# Patient Record
Sex: FEMALE | Race: ASIAN | Employment: FULL TIME | URBAN - METROPOLITAN AREA
[De-identification: names, ages, dates, MRNs, and addresses within clinical notes are randomized per-mention and may not be internally consistent; named-entity substitution may affect disease eponyms.]

---

## 2018-05-29 ENCOUNTER — OFFICE VISIT (OUTPATIENT)
Dept: FAMILY MEDICINE | Facility: CLINIC | Age: 63
End: 2018-05-29
Payer: COMMERCIAL

## 2018-05-29 VITALS
HEART RATE: 84 BPM | BODY MASS INDEX: 24.8 KG/M2 | WEIGHT: 123 LBS | DIASTOLIC BLOOD PRESSURE: 90 MMHG | RESPIRATION RATE: 16 BRPM | SYSTOLIC BLOOD PRESSURE: 152 MMHG | HEIGHT: 59 IN | TEMPERATURE: 98.4 F | OXYGEN SATURATION: 100 %

## 2018-05-29 DIAGNOSIS — Z11.59 NEED FOR HEPATITIS C SCREENING TEST: ICD-10-CM

## 2018-05-29 DIAGNOSIS — Z12.11 SCREEN FOR COLON CANCER: ICD-10-CM

## 2018-05-29 DIAGNOSIS — Z11.4 SCREENING FOR HIV (HUMAN IMMUNODEFICIENCY VIRUS): ICD-10-CM

## 2018-05-29 DIAGNOSIS — Z13.6 CARDIOVASCULAR SCREENING; LDL GOAL LESS THAN 130: ICD-10-CM

## 2018-05-29 DIAGNOSIS — Z12.31 VISIT FOR SCREENING MAMMOGRAM: ICD-10-CM

## 2018-05-29 DIAGNOSIS — Z00.01 ENCOUNTER FOR ROUTINE ADULT PHYSICAL EXAM WITH ABNORMAL FINDINGS: Primary | ICD-10-CM

## 2018-05-29 DIAGNOSIS — Z12.4 SCREENING FOR MALIGNANT NEOPLASM OF CERVIX: ICD-10-CM

## 2018-05-29 DIAGNOSIS — L91.8 SKIN TAG: ICD-10-CM

## 2018-05-29 DIAGNOSIS — I10 HYPERTENSION, GOAL BELOW 140/90: ICD-10-CM

## 2018-05-29 PROCEDURE — 99396 PREV VISIT EST AGE 40-64: CPT | Mod: 25 | Performed by: FAMILY MEDICINE

## 2018-05-29 PROCEDURE — 11200 RMVL SKIN TAGS UP TO&INC 15: CPT | Performed by: FAMILY MEDICINE

## 2018-05-29 ASSESSMENT — PAIN SCALES - GENERAL: PAINLEVEL: NO PAIN (0)

## 2018-05-29 NOTE — NURSING NOTE
The following medication was given by Dr Gee during procedure:     MEDICATION: Lidocaine 1% with epinephrine   LOT #: [id number]  :  [employer]  EXPIRATION DATE:  08/19 opened bottle expiring 05/31/2018  NDC#: 71301-668-81     Rian Canchola CMA

## 2018-05-29 NOTE — PATIENT INSTRUCTIONS
At [facility], we strive to deliver an exceptional experience to you, every time we see you.  If you receive a survey in the mail, please send us back your thoughts. We really do value your feedback.    Based on your medical history, these are the current health maintenance/preventive care services that you are due for (some may have been done at this visit.)  Health Maintenance Due   Topic Date Due     HIV SCREEN (SYSTEM ASSIGNED)  08/16/1975     HEPATITIS C SCREENING  08/16/1975     FIT Q1 YR  08/15/2016     MAMMO SCREEN Q2 YR (SYSTEM ASSIGNED)  09/10/2017     PAP Q3 YR  08/06/2018         Suggested websites for health information:  Www.[email or web address].[email or web address] : Up to date and easily searchable information on multiple topics.  Www.medlineplus.gov : medication info, interactive tutorials, watch real surgeries online  Www.familydoctor.org : good info from the Academy of Family Physicians  Www.cdc.gov : public health info, travel advisories, epidemics (H1N1)  Www.aap.org : children's health info, normal development, vaccinations  Www.health.[facility].[place].us : [place] dept of health, public health issues in [place], N1N1    Your care team:                            Family Medicine Internal Medicine   MD Tonio Leyva MD Shantel Branch-Fleming, MD Katya Georgiev PA-C Nam Ho, MD Pediatrics   BRICE Edwards, MD Radha Rangel CNP, MD Deborah Mielke, MD Kim Thein, APRN [facility]      Clinic hours: Monday - Thursday 7 am-7 pm; Fridays 7 am-5 pm.   Urgent care: Monday - Friday 11 am-9 pm; Saturday and Sunday 9 am-5 pm.  Pharmacy : Monday -Thursday 8 am-8 pm; Friday 8 am-6 pm; Saturday and Sunday 9 am-5 pm.     Clinic: [phone]   Pharmacy: [phone]    Preventive Health Recommendations  Female Ages 50 - 64    Yearly exam: See your health care provider every year in order to  o Review health changes.   o Discuss preventive care.     o Review your medicines if your doctor has prescribed any.      Get a Pap test every three years (unless you have an abnormal result and your provider advises testing more often).    If you get Pap tests with HPV test, you only need to test every 5 years, unless you have an abnormal result.     You do not need a Pap test if your uterus was removed (hysterectomy) and you have not had cancer.    You should be tested each year for STDs (sexually transmitted diseases) if you're at risk.     Have a mammogram every 1 to 2 years.    Have a colonoscopy at age 50, or have a yearly FIT test (stool test). These exams screen for colon cancer.      Have a cholesterol test every 5 years, or more often if advised.    Have a diabetes test (fasting glucose) every three years. If you are at risk for diabetes, you should have this test more often.     If you are at risk for osteoporosis (brittle bone disease), think about having a bone density scan (DEXA).    Shots: Get a flu shot each year. Get a tetanus shot every 10 years.    Nutrition:     Eat at least 5 servings of fruits and vegetables each day.    Eat whole-grain bread, whole-wheat pasta and brown rice instead of white grains and rice.    Talk to your provider about Calcium and Vitamin D.     Lifestyle    Exercise at least 150 minutes a week (30 minutes a day, 5 days a week). This will help you control your weight and prevent disease.    Limit alcohol to one drink per day.    No smoking.     Wear sunscreen to prevent skin cancer.     See your dentist every six months for an exam and cleaning.    See your eye doctor every 1 to 2 years.

## 2018-05-29 NOTE — PROGRESS NOTES
SUBJECTIVE:   CC: Janet Giordano is an 62 year old woman who presents for preventive health visit.     Physical   Annual:     Getting at least 3 servings of Calcium per day::  Yes    Bi-annual eye exam::  Yes (Next appointment 5/31/18)    Dental care twice a year::  Yes    Sleep apnea or symptoms of sleep apnea::  None    Diet::  Regular (no restrictions)    Frequency of exercise::  6-7 days/week    Duration of exercise::  15-30 minutes    Taking medications regularly::  No    Barriers to taking medications::  Other (prn GERD medication not needed at this time)    Additional concerns today::  YES (lesions on left side of neck)            Skin tags and would like these to be removed due to bleeding and irritation from clothing.     Hypertension Follow-up      Outpatient blood pressures are not being checked.    Low Salt Diet: no added salt      Today's PHQ-2 Score:   PHQ-2 ( 1999 Pfizer) 5/29/2018   Q1: Little interest or pleasure in doing things 0   Q2: Feeling down, depressed or hopeless 0   PHQ-2 Score 0       Abuse: Current or Past(Physical, Sexual or Emotional)- No  Do you feel safe in your environment - Yes    Social History   Substance Use Topics     Smoking status: Never Smoker     Smokeless tobacco: Never Used     Alcohol use No     No flowsheet data found.No flowsheet data found.    Reviewed orders with patient.  Reviewed health maintenance and updated orders accordingly - Yes  Labs reviewed in EPIC  BP Readings from Last 3 Encounters:   05/29/18 152/90   01/08/16 124/84   12/17/15 134/78    Wt Readings from Last 3 Encounters:   05/29/18 123 lb (55.8 kg)   01/08/16 114 lb 14.4 oz (52.1 kg)   12/17/15 115 lb 4.8 oz (52.3 kg)                  Patient Active Problem List   Diagnosis     Hypertension, goal below 140/90     CARDIOVASCULAR SCREENING; LDL GOAL LESS THAN 130     Chronic fatigue     Gastroesophageal reflux disease with esophagitis     Immigrant with language difficulty     Advanced directives,  counseling/discussion     Helicobacter pylori infection     Gallstones     Past Surgical History:   Procedure Laterality Date     ESOPHAGOSCOPY, GASTROSCOPY, DUODENOSCOPY (EGD), COMBINED N/A 10/23/2015    Procedure: COMBINED ESOPHAGOSCOPY, GASTROSCOPY, DUODENOSCOPY (EGD);  Surgeon: Duane, William Charles, MD;  Location:  OR     ESOPHAGOSCOPY, GASTROSCOPY, DUODENOSCOPY (EGD), COMBINED N/A 10/23/2015    Procedure: COMBINED ESOPHAGOSCOPY, GASTROSCOPY, DUODENOSCOPY (EGD), BIOPSY SINGLE OR MULTIPLE;  Surgeon: Duane, William Charles, MD;  Location:  OR       Social History   Substance Use Topics     Smoking status: Never Smoker     Smokeless tobacco: Never Used     Alcohol use No     History reviewed. No pertinent family history.      Current Outpatient Prescriptions   Medication Sig Dispense Refill     Cholecalciferol (VITAMIN D3 PO) Take 600 Units by mouth        Multiple Vitamins-Calcium (ONE-A-DAY WOMENS PO)        order for DME BP cuff with supplies, brand as covered by insurance.  Dx: HTN 1 each 0     ranitidine (ZANTAC) 150 MG tablet Take 1 tablet (150 mg) by mouth 2 times daily (Patient not taking: Reported on 5/29/2018) 60 tablet 1     sucralfate (CARAFATE) 1 GM/10ML suspension Take 10 mLs (1 g) by mouth 2 times daily (Patient not taking: Reported on 5/29/2018) 420 mL 1     Allergies   Allergen Reactions     Contrast Dye Shortness Of Breath and Cough     After CT contrast injection she felt nauseous and lead to cough and shortness of breath. Radiologist felt this was an allergic reaction. Isovue 370     Recent Labs   Lab Test  11/03/15   1442  10/20/15   1109  09/03/15   0719  08/06/15   1103   LDL   --    --   152*   --    HDL   --    --   56   --    TRIG   --    --   211*   --    ALT  44  38   --    --    CR  0.65  0.53   --   0.62   GFRESTIMATED  >90  Non  GFR Calc    >90  Non  GFR Calc     --   >90  Non  GFR Calc     GFRESTBLACK  >90   GFR  "Calc    >90   GFR Calc     --   >90   GFR Calc     POTASSIUM  3.8  3.5   --   3.7   TSH   --   0.64   --   0.83        Patient over age 50, mutual decision to screen reflected in health maintenance.    Pertinent mammograms are reviewed under the imaging tab.  History of abnormal Pap smear: NO - age 30-65 PAP every 5 years with negative HPV co-testing recommended    Reviewed and updated as needed this visit by clinical staff  Tobacco  Allergies  Meds  Med Hx  Surg Hx  Fam Hx  Soc Hx        Reviewed and updated as needed this visit by Provider            Review of Systems  CONSTITUTIONAL: NEGATIVE for fever, chills, change in weight  INTEGUMENTARY/SKIN: NEGATIVE for worrisome rashes, moles or lesions  EYES: NEGATIVE for vision changes or irritation  ENT: NEGATIVE for ear, mouth and throat problems  RESP: NEGATIVE for significant cough or SOB  BREAST: NEGATIVE for masses, tenderness or discharge  CV: NEGATIVE for chest pain, palpitations or peripheral edema  GI: NEGATIVE for nausea, abdominal pain, heartburn, or change in bowel habits  : NEGATIVE for unusual urinary or vaginal symptoms. No vaginal bleeding.  MUSCULOSKELETAL: NEGATIVE for significant arthralgias or myalgia  NEURO: NEGATIVE for weakness, dizziness or paresthesias  PSYCHIATRIC: NEGATIVE for changes in mood or affect      OBJECTIVE:   /90 (BP Location: Left arm, Patient Position: Chair, Cuff Size: Adult Regular)  Pulse 84  Temp 98.4  F (36.9  C) (Oral)  Resp 16  Ht 4' 10.5\" (1.486 m)  Wt 123 lb (55.8 kg)  LMP 05/29/2008 (Approximate)  SpO2 100%  BMI 25.27 kg/m2  Physical Exam  GENERAL APPEARANCE: healthy, alert and no distress  EYES: Eyes grossly normal to inspection, PERRL and conjunctivae and sclerae normal  HENT: ear canals and TM's normal, nose and mouth without ulcers or lesions, oropharynx clear and oral mucous membranes moist  NECK: no adenopathy, no asymmetry, masses, or scars and thyroid normal " "to palpation  RESP: lungs clear to auscultation - no rales, rhonchi or wheezes  CV: regular rates and rhythm, normal S1 S2, no S3 or S4, no murmur, click or rub, no peripheral edema and peripheral pulses strong  ABDOMEN: soft, nontender, no hepatosplenomegaly, no masses and bowel sounds normal  MS: no musculoskeletal defects are noted and gait is age appropriate without ataxia  SKIN: no suspicious lesions or rashes, skin tags around neck region and right axilla.   NEURO: Normal strength and tone, sensory exam grossly normal, mentation intact and speech normal  PSYCH: mentation appears normal and affect normal/bright     ASSESSMENT/PLAN:       ICD-10-CM    1. Hypertension, goal below 140/90 I10 **CBC with platelets FUTURE anytime     **Basic metabolic panel FUTURE anytime   2. Screen for colon cancer Z12.11    3. Visit for screening mammogram Z12.31 MA SCREENING DIGITAL BILAT - Future  (s+30)   4. Screening for malignant neoplasm of cervix Z12.4    5. Need for hepatitis C screening test Z11.59    6. Screening for HIV (human immunodeficiency virus) Z11.4    7. CARDIOVASCULAR SCREENING; LDL GOAL LESS THAN 130 Z13.6 Lipid panel reflex to direct LDL Fasting       COUNSELING:  Reviewed preventive health counseling, as reflected in patient instructions       Regular exercise       Healthy diet/nutrition         reports that she has never smoked. She has never used smokeless tobacco.    Estimated body mass index is 25.27 kg/(m^2) as calculated from the following:    Height as of this encounter: 4' 10.5\" (1.486 m).    Weight as of this encounter: 123 lb (55.8 kg).       Counseling Resources:  ATP IV Guidelines  Pooled Cohorts Equation Calculator  Breast Cancer Risk Calculator  FRAX Risk Assessment  ICSI Preventive Guidelines  Dietary Guidelines for Americans, 2010  [employer]'s MyPlate  ASA Prophylaxis  Lung CA Screening    Marixa Gee MD  [facility]    Subjective: Janet Vo a 62 year old female who " presents today for lesion removal. The lesion(s) is/are located on the axilla and neck, number 10 and measures 0.1cm She The patient reports the lesions are bleeding and painful and denies other significant symptoms on ROS. Medications reviewed.    Pause for the cause has been completed prior to the prceedure.   1. Janet was identified by both name and date of birth   2. The correct site was identified   3. Site was marked by provider    4. Written informed consent correct and signed or verbal authorization  to proceed was obtained   5. Verifed necessary supplies, equipment, and diagnostics are available    6. Time out was performed immediately prior to procedure    Objective: The lesion(s) is/are of the above mentioned size and location and is/are typical. The area was prepped and appropriately anesthetized. Using the usual technique, excision with an iris scissors was performed. An appropriate dressing was applied. The procedure was well tolerated and without complications.     Assessment: 10 skin tags, 3 left neck, 5 right neck and 2 right axilla    Plan: Follow up: The patient may return prn.. Wound care instructions provided.  Patient was instructed to be alert for any signs of cutaneous infection.

## 2018-05-29 NOTE — MR AVS SNAPSHOT
After Visit Summary   5/29/2018    Janet Giordano    MRN: [id number]           Patient Information     Date Of Birth          1955        Visit Information        Provider Department      5/29/2018 2:20 PM Marixa Gee MD [facility]        Today's Diagnoses     Encounter for routine adult physical exam with abnormal findings    -  1    Screen for colon cancer        Visit for screening mammogram        Screening for malignant neoplasm of cervix        Need for hepatitis C screening test        Screening for HIV (human immunodeficiency virus)        Hypertension, goal below 140/90        CARDIOVASCULAR SCREENING; LDL GOAL LESS THAN 130        Skin tag          Care Instructions    At [facility], we strive to deliver an exceptional experience to you, every time we see you.  If you receive a survey in the mail, please send us back your thoughts. We really do value your feedback.    Based on your medical history, these are the current health maintenance/preventive care services that you are due for (some may have been done at this visit.)  Health Maintenance Due   Topic Date Due     HIV SCREEN (SYSTEM ASSIGNED)  08/16/1975     HEPATITIS C SCREENING  08/16/1975     FIT Q1 YR  08/15/2016     MAMMO SCREEN Q2 YR (SYSTEM ASSIGNED)  09/10/2017     PAP Q3 YR  08/06/2018         Suggested websites for health information:  Www.[email or web address] : Up to date and easily searchable information on multiple topics.  Www.medlineplus.gov : medication info, interactive tutorials, watch real surgeries online  Www.familydoctor.org : good info from the Academy of Family Physicians  Www.cdc.gov : public health info, travel advisories, epidemics (H1N1)  Www.aap.org : children's health info, normal development, vaccinations  Www.health.state.[place].us : [place] dept of health, public health issues in [place], N1N1    Your care team:                            Family Medicine Internal Medicine    MD Tonio Leyva MD Shantel Branch-Fleming, MD Katya Georgiev PA-C Nam Ho, MD Pediatrics   BRICE Edwards, MARY Sandhu APRMD Radha Barney CNP, MD Deborah Mielke, MD Kim Thein, APRSand[facility] Critical Access [facility]      Clinic hours: Monday - Thursday 7 am-7 pm; Fridays 7 am-5 pm.   Urgent care: Monday - Friday 11 am-9 pm; Saturday and Sunday 9 am-5 pm.  Pharmacy : Monday -Thursday 8 am-8 pm; Friday 8 am-6 pm; Saturday and Sunday 9 am-5 pm.     Clinic: [phone]   Pharmacy: [phone]    Preventive Health Recommendations  Female Ages 50 - 64    Yearly exam: See your health care provider every year in order to  o Review health changes.   o Discuss preventive care.    o Review your medicines if your doctor has prescribed any.      Get a Pap test every three years (unless you have an abnormal result and your provider advises testing more often).    If you get Pap tests with HPV test, you only need to test every 5 years, unless you have an abnormal result.     You do not need a Pap test if your uterus was removed (hysterectomy) and you have not had cancer.    You should be tested each year for STDs (sexually transmitted diseases) if you're at risk.     Have a mammogram every 1 to 2 years.    Have a colonoscopy at age 50, or have a yearly FIT test (stool test). These exams screen for colon cancer.      Have a cholesterol test every 5 years, or more often if advised.    Have a diabetes test (fasting glucose) every three years. If you are at risk for diabetes, you should have this test more often.     If you are at risk for osteoporosis (brittle bone disease), think about having a bone density scan (DEXA).    Shots: Get a flu shot each year. Get a tetanus shot every 10 years.    Nutrition:     Eat at least 5 servings of fruits and vegetables each day.    Eat whole-grain bread, whole-wheat pasta and brown rice instead of white grains and rice.    Talk to your  provider about Calcium and Vitamin D.     Lifestyle    Exercise at least 150 minutes a week (30 minutes a day, 5 days a week). This will help you control your weight and prevent disease.    Limit alcohol to one drink per day.    No smoking.     Wear sunscreen to prevent skin cancer.     See your dentist every six months for an exam and cleaning.    See your eye doctor every 1 to 2 years.            Follow-ups after your visit        Future tests that were ordered for you today     Open Future Orders        Priority Expected Expires Ordered    MA SCREENING DIGITAL BILAT - Future  (s+30) Routine  5/29/2019 5/29/2018    **CBC with platelets FUTURE anytime Routine 5/29/2018 5/29/2019 5/29/2018    Lipid panel reflex to direct LDL Fasting Routine 5/29/2018 5/29/2019 5/29/2018    **Basic metabolic panel FUTURE anytime Routine 5/29/2018 5/29/2019 5/29/2018            Who to contact     If you have questions or need follow up information about today's clinic visit or your schedule please contact [facility] directly at [phone].  Normal or non-critical lab and imaging results will be communicated to you by Friend[employer] W[employer] Apphart, letter or phone within 4 business days after the clinic has received the results. If you do not hear from us within 7 days, please contact the clinic through Paxfiret or phone. If you have a critical or abnormal lab result, we will notify you by phone as soon as possible.  Submit refill requests through Bee On The Go or call your pharmacy and they will forward the refill request to us. Please allow 3 business days for your refill to be completed.          Additional Information About Your Visit        Bee On The Go Information     [employer]ee On The Go gives you secure access to your electronic health record. If you see a primary care provider, you can also send messages to your care team and make appointments. If you have questions, please call your primary care clinic.  If you do not have a primary care provider,  "please call [phone] and they will assist you.        Care EveryWhere ID     This is your Care EveryWhere ID. This could be used by other organizations to access your [facility] medical records  [id number]        Your Vitals Were     Pulse Temperature Respirations Height Last Period Pulse Oximetry    84 98.4  F (36.9  C) (Oral) 16 4' 10.5\" (1.486 m) 05/29/2008 (Approximate) 100%    BMI (Body Mass Index)                   25.27 kg/m2            Blood Pressure from Last 3 Encounters:   05/29/18 152/90   01/08/16 124/84   12/17/15 134/78    Weight from Last 3 Encounters:   05/29/18 123 lb (55.8 kg)   01/08/16 114 lb 14.4 oz (52.1 kg)   12/17/15 115 lb 4.8 oz (52.3 kg)               Primary Care Provider Office Phone # Fax #    Samuel Torrez [phone]       [address] [address] [facility] [address]        Equal Access to Services     [facility]: Hadii aad ku hadasho Soomaali, waaxda luqadaha, qaybta kaalmada adeegyada, waxay melina rodrigues . So [facility] [phone].    ATENCIÓN: Si habla español, tiene a simpson disposición servicios gratuitos de asistencia lingüística. Llame al [phone].    We comply with applicable federal civil rights laws and [place] laws. We do not discriminate on the basis of race, color, national origin, age, disability, sex, sexual orientation, or gender identity.            Thank you!     Thank you for choosing [facility]  for your care. Our goal is always to provide you with excellent care. Hearing back from our patients is one way we can continue to improve our services. Please take a few minutes to complete the written survey that you may receive in the mail after your visit with us. Thank you!             Your Updated Medication List - Protect others around you: Learn how to safely use, store and throw away your medicines at www.disposemymeds.org.          This list is accurate as of 5/29/18  3:44 PM.  Always use your most recent " med list.                   Brand Name Dispense Instructions for use Diagnosis    ONE-A-DAY WOMENS PO           order for DME     1 each    BP cuff with supplies, brand as covered by insurance.  Dx: HTN    Hypertension, goal below 140/90       ranitidine 150 MG tablet    ZANTAC    60 tablet    Take 1 tablet (150 mg) by mouth 2 times daily    Gastroesophageal reflux disease with esophagitis       sucralfate 1 GM/10ML suspension    CARAFATE    420 mL    Take 10 mLs (1 g) by mouth 2 times daily    Gastroesophageal reflux disease with esophagitis       VITAMIN D3 PO      Take 600 Units by mouth

## 2018-06-08 ENCOUNTER — RADIANT APPOINTMENT (OUTPATIENT)
Dept: MAMMOGRAPHY | Facility: CLINIC | Age: 63
End: 2018-06-08
Payer: COMMERCIAL

## 2018-06-08 DIAGNOSIS — Z12.31 VISIT FOR SCREENING MAMMOGRAM: ICD-10-CM

## 2018-06-08 PROCEDURE — 77067 SCR MAMMO BI INCL CAD: CPT | Mod: TC

## 2018-06-08 NOTE — LETTER
June 11, 2018      Janet Giordano  [address] [place] [address]        Dear Janet Giordano,    I am happy to let you know that your mammogram was normal. You may schedule a follow up mammogram in 1-2 years depending on your risk factors and family history. Please let me know if you have any questions about your results. You should be receiving a letter from the radiologist.    Sincerely,      Marixa Gee MD/raaceli    Resulted Orders   MA SCREENING DIGITAL BILAT - Future  (s+30)    Narrative    Examination: Bilateral digital screening mammography with computer  aided detection, 6/8/2018 3:11 PM.    Comparison: 09/10/2015    History: No current breast concerns.    BREAST DENSITY: Scattered fibroglandular densities.    COMMENTS:  No suspicious finding.      Impression    IMPRESSION: BI-RADS CATEGORY: 1 -  NEGATIVE.    RECOMMENDED FOLLOW-UP: Annual Mammography.      The patient will be notified of the results.     JORGE GARCIA MD

## 2018-06-09 DIAGNOSIS — Z13.6 CARDIOVASCULAR SCREENING; LDL GOAL LESS THAN 130: ICD-10-CM

## 2018-06-09 DIAGNOSIS — I10 HYPERTENSION, GOAL BELOW 140/90: ICD-10-CM

## 2018-06-09 LAB
ERYTHROCYTE [DISTWIDTH] IN BLOOD BY AUTOMATED COUNT: 13.2 % (ref 10–15)
HCT VFR BLD AUTO: 42.2 % (ref 35–47)
HGB BLD-MCNC: 14 G/DL (ref 11.7–15.7)
MCH RBC QN AUTO: 28.7 PG (ref 26.5–33)
MCHC RBC AUTO-ENTMCNC: 33.2 G/DL (ref 31.5–36.5)
MCV RBC AUTO: 87 FL (ref 78–100)
PLATELET # BLD AUTO: 241 10E9/L (ref 150–450)
RBC # BLD AUTO: 4.88 10E12/L (ref 3.8–5.2)
WBC # BLD AUTO: 6.8 10E9/L (ref 4–11)

## 2018-06-09 PROCEDURE — 36415 COLL VENOUS BLD VENIPUNCTURE: CPT | Performed by: FAMILY MEDICINE

## 2018-06-09 PROCEDURE — 80048 BASIC METABOLIC PNL TOTAL CA: CPT | Performed by: FAMILY MEDICINE

## 2018-06-09 PROCEDURE — 85027 COMPLETE CBC AUTOMATED: CPT | Performed by: FAMILY MEDICINE

## 2018-06-09 PROCEDURE — 80061 LIPID PANEL: CPT | Performed by: FAMILY MEDICINE

## 2018-06-10 NOTE — PROGRESS NOTES
Dear Janet Giordano,    I am happy to let you know that your mammogram was normal. You may schedule a follow up mammogram in 1-2 years depending on your risk factors and family history. Please let me know if you have any questions about your results. You should be receiving a letter from the radiologist.    Marixa Gee MD

## 2018-06-11 LAB
ANION GAP SERPL CALCULATED.3IONS-SCNC: 11 MMOL/L (ref 3–14)
BUN SERPL-MCNC: 12 MG/DL (ref 7–30)
CALCIUM SERPL-MCNC: 9 MG/DL (ref 8.5–10.1)
CHLORIDE SERPL-SCNC: 109 MMOL/L (ref 94–109)
CHOLEST SERPL-MCNC: 245 MG/DL
CO2 SERPL-SCNC: 22 MMOL/L (ref 20–32)
CREAT SERPL-MCNC: 0.62 MG/DL (ref 0.52–1.04)
GFR SERPL CREATININE-BSD FRML MDRD: >90 ML/MIN/1.7M2
GLUCOSE SERPL-MCNC: 96 MG/DL (ref 70–99)
HDLC SERPL-MCNC: 51 MG/DL
LDLC SERPL CALC-MCNC: 142 MG/DL
NONHDLC SERPL-MCNC: 194 MG/DL
POTASSIUM SERPL-SCNC: 3.8 MMOL/L (ref 3.4–5.3)
SODIUM SERPL-SCNC: 142 MMOL/L (ref 133–144)
TRIGL SERPL-MCNC: 260 MG/DL

## 2018-06-13 NOTE — PROGRESS NOTES
Dear Janet    Your test results are attached. I am happy to let you know that they are stable.    The blood sugar is normal and you do not have diabetes. The kidneys are healthy. The cholesterol is staying the same and you do not need medication. We can recheck labs in 1 year.     Please contact me by [email or web address] if you have any questions about your labs or management.    Marixa Gee MD

## 2019-06-14 ENCOUNTER — OFFICE VISIT (OUTPATIENT)
Dept: FAMILY MEDICINE | Facility: CLINIC | Age: 64
End: 2019-06-14
Payer: COMMERCIAL

## 2019-06-14 VITALS
OXYGEN SATURATION: 95 % | DIASTOLIC BLOOD PRESSURE: 88 MMHG | TEMPERATURE: 99.2 F | HEIGHT: 59 IN | WEIGHT: 123 LBS | HEART RATE: 105 BPM | BODY MASS INDEX: 24.8 KG/M2 | RESPIRATION RATE: 12 BRPM | SYSTOLIC BLOOD PRESSURE: 150 MMHG

## 2019-06-14 DIAGNOSIS — K80.20 GALLSTONES: ICD-10-CM

## 2019-06-14 DIAGNOSIS — I10 HYPERTENSION, GOAL BELOW 140/90: ICD-10-CM

## 2019-06-14 DIAGNOSIS — Z00.00 ROUTINE GENERAL MEDICAL EXAMINATION AT A HEALTH CARE FACILITY: Primary | ICD-10-CM

## 2019-06-14 DIAGNOSIS — Z11.3 ROUTINE SCREENING FOR STI (SEXUALLY TRANSMITTED INFECTION): ICD-10-CM

## 2019-06-14 DIAGNOSIS — R35.0 URINARY FREQUENCY: ICD-10-CM

## 2019-06-14 DIAGNOSIS — Z13.220 LIPID SCREENING: ICD-10-CM

## 2019-06-14 DIAGNOSIS — R53.82 CHRONIC FATIGUE: ICD-10-CM

## 2019-06-14 LAB
ALBUMIN SERPL-MCNC: 3.3 G/DL (ref 3.4–5)
ALBUMIN UR-MCNC: NEGATIVE MG/DL
ALP SERPL-CCNC: 105 U/L (ref 40–150)
ALT SERPL W P-5'-P-CCNC: 59 U/L (ref 0–50)
ANION GAP SERPL CALCULATED.3IONS-SCNC: 8 MMOL/L (ref 3–14)
APPEARANCE UR: CLEAR
AST SERPL W P-5'-P-CCNC: 38 U/L (ref 0–45)
BILIRUB SERPL-MCNC: 0.6 MG/DL (ref 0.2–1.3)
BILIRUB UR QL STRIP: NEGATIVE
BUN SERPL-MCNC: 11 MG/DL (ref 7–30)
CALCIUM SERPL-MCNC: 8.8 MG/DL (ref 8.5–10.1)
CHLORIDE SERPL-SCNC: 109 MMOL/L (ref 94–109)
CHOLEST SERPL-MCNC: 215 MG/DL
CO2 SERPL-SCNC: 24 MMOL/L (ref 20–32)
COLOR UR AUTO: YELLOW
CREAT SERPL-MCNC: 0.63 MG/DL (ref 0.52–1.04)
ERYTHROCYTE [DISTWIDTH] IN BLOOD BY AUTOMATED COUNT: 13.9 % (ref 10–15)
GFR SERPL CREATININE-BSD FRML MDRD: >90 ML/MIN/{1.73_M2}
GLUCOSE SERPL-MCNC: 111 MG/DL (ref 70–99)
GLUCOSE UR STRIP-MCNC: NEGATIVE MG/DL
HCT VFR BLD AUTO: 38.3 % (ref 35–47)
HDLC SERPL-MCNC: 52 MG/DL
HGB BLD-MCNC: 12.9 G/DL (ref 11.7–15.7)
HGB UR QL STRIP: NEGATIVE
KETONES UR STRIP-MCNC: NEGATIVE MG/DL
LDLC SERPL CALC-MCNC: 138 MG/DL
LEUKOCYTE ESTERASE UR QL STRIP: NEGATIVE
MCH RBC QN AUTO: 29 PG (ref 26.5–33)
MCHC RBC AUTO-ENTMCNC: 33.7 G/DL (ref 31.5–36.5)
MCV RBC AUTO: 86 FL (ref 78–100)
NITRATE UR QL: NEGATIVE
NONHDLC SERPL-MCNC: 163 MG/DL
PH UR STRIP: 7 PH (ref 5–7)
PLATELET # BLD AUTO: 266 10E9/L (ref 150–450)
POTASSIUM SERPL-SCNC: 3.6 MMOL/L (ref 3.4–5.3)
PROT SERPL-MCNC: 7.9 G/DL (ref 6.8–8.8)
RBC # BLD AUTO: 4.45 10E12/L (ref 3.8–5.2)
SODIUM SERPL-SCNC: 141 MMOL/L (ref 133–144)
SOURCE: NORMAL
SP GR UR STRIP: 1.01 (ref 1–1.03)
TRIGL SERPL-MCNC: 125 MG/DL
TSH SERPL DL<=0.005 MIU/L-ACNC: 1.45 MU/L (ref 0.4–4)
UROBILINOGEN UR STRIP-ACNC: 0.2 EU/DL (ref 0.2–1)
WBC # BLD AUTO: 11.7 10E9/L (ref 4–11)

## 2019-06-14 PROCEDURE — 85027 COMPLETE CBC AUTOMATED: CPT | Performed by: FAMILY MEDICINE

## 2019-06-14 PROCEDURE — 80053 COMPREHEN METABOLIC PANEL: CPT | Performed by: FAMILY MEDICINE

## 2019-06-14 PROCEDURE — 86803 HEPATITIS C AB TEST: CPT | Performed by: FAMILY MEDICINE

## 2019-06-14 PROCEDURE — 84443 ASSAY THYROID STIM HORMONE: CPT | Performed by: FAMILY MEDICINE

## 2019-06-14 PROCEDURE — 80061 LIPID PANEL: CPT | Performed by: FAMILY MEDICINE

## 2019-06-14 PROCEDURE — 36415 COLL VENOUS BLD VENIPUNCTURE: CPT | Performed by: FAMILY MEDICINE

## 2019-06-14 PROCEDURE — 81003 URINALYSIS AUTO W/O SCOPE: CPT | Performed by: FAMILY MEDICINE

## 2019-06-14 PROCEDURE — 99396 PREV VISIT EST AGE 40-64: CPT | Performed by: FAMILY MEDICINE

## 2019-06-14 PROCEDURE — 87389 HIV-1 AG W/HIV-1&-2 AB AG IA: CPT | Performed by: FAMILY MEDICINE

## 2019-06-14 RX ORDER — CHLORAL HYDRATE 500 MG
2 CAPSULE ORAL DAILY
COMMUNITY

## 2019-06-14 ASSESSMENT — PAIN SCALES - GENERAL: PAINLEVEL: NO PAIN (0)

## 2019-06-14 ASSESSMENT — MIFFLIN-ST. JEOR: SCORE: 1024.58

## 2019-06-14 NOTE — PROGRESS NOTES
SUBJECTIVE:   CC: Janet Giordano is an 63 year old woman who presents for preventive health visit.     Healthy Habits:    Do you get at least three servings of calcium containing foods daily (dairy, green leafy vegetables, etc.)? no    Amount of exercise or daily activities, outside of work: 5-7 day(s) per week    Problems taking medications regularly No    Medication side effects: No    Have you had an eye exam in the past two years? yes    Do you see a dentist twice per year? yes    Do you have sleep apnea, excessive snoring or daytime drowsiness?no      Hyperlipidemia Follow-Up      Are you having any of the following symptoms? (Select all that apply)  No complaints of shortness of breath, chest pain or pressure.  No increased sweating or nausea with activity.  No left-sided neck or arm pain.  No complaints of pain in calves when walking 1-2 blocks.    Are you regularly taking any medication or supplement to lower your cholesterol?   No    Are you having muscle aches or other side effects that you think could be caused by your cholesterol lowering medication?  No      Hypertension Follow-up      Do you check your blood pressure regularly outside of the clinic? No     Are you following a low salt diet? Yes    Are your blood pressures ever more than 140 on the top number (systolic) OR more   than 90 on the bottom number (diastolic), for example 140/90? No    Today's PHQ-2 Score:   PHQ-2 ( 1999 Pfizer) 6/14/2019 5/29/2018   Q1: Little interest or pleasure in doing things 0 0   Q2: Feeling down, depressed or hopeless 0 0   PHQ-2 Score 0 0       Abuse: Current or Past(Physical, Sexual or Emotional)- No  Do you feel safe in your environment? Yes    Social History     Tobacco Use     Smoking status: Never Smoker     Smokeless tobacco: Never Used   Substance Use Topics     Alcohol use: No     Alcohol/week: 0.0 oz     If you drink alcohol do you typically have >3 drinks per day or >7 drinks per week? No                      Reviewed orders with patient.  Reviewed health maintenance and updated orders accordingly - Yes  Lab work is in process  Labs reviewed in EPIC  BP Readings from Last 3 Encounters:   06/14/19 150/88   05/29/18 152/90   01/08/16 124/84    Wt Readings from Last 3 Encounters:   06/14/19 55.8 kg (123 lb)   05/29/18 55.8 kg (123 lb)   01/08/16 52.1 kg (114 lb 14.4 oz)                  Patient Active Problem List   Diagnosis     Hypertension, goal below 140/90     CARDIOVASCULAR SCREENING; LDL GOAL LESS THAN 130     Chronic fatigue     Gastroesophageal reflux disease with esophagitis     Immigrant with language difficulty     Advanced directives, counseling/discussion     Helicobacter pylori infection     Gallstones     Past Surgical History:   Procedure Laterality Date     ESOPHAGOSCOPY, GASTROSCOPY, DUODENOSCOPY (EGD), COMBINED N/A 10/23/2015    Procedure: COMBINED ESOPHAGOSCOPY, GASTROSCOPY, DUODENOSCOPY (EGD);  Surgeon: Duane, William Charles, MD;  Location: [facility]     ESOPHAGOSCOPY, GASTROSCOPY, DUODENOSCOPY (EGD), COMBINED N/A 10/23/2015    Procedure: COMBINED ESOPHAGOSCOPY, GASTROSCOPY, DUODENOSCOPY (EGD), BIOPSY SINGLE OR MULTIPLE;  Surgeon: Duane, William Charles, MD;  Location: [facility]       Social History     Tobacco Use     Smoking status: Never Smoker     Smokeless tobacco: Never Used   Substance Use Topics     Alcohol use: No     Alcohol/week: 0.0 oz     History reviewed. No pertinent family history.      Current Outpatient Medications   Medication Sig Dispense Refill     fish oil-omega-3 fatty acids 1000 MG capsule Take 2 g by mouth daily       Glucosamine-Chondroitin (GLUCOSAMINE CHONDR COMPLEX PO)        Multiple Vitamins-Minerals (PRESERVISION AREDS 2 PO)        order for DME BP cuff with supplies, brand as covered by insurance.  Dx: HTN 1 each 0     vitamin B complex with vitamin C (VITAMIN  B COMPLEX) tablet Take 1 tablet by mouth daily       Cholecalciferol (VITAMIN D3 PO) Take 600 Units by mouth         Allergies   Allergen Reactions     Contrast Dye Shortness Of Breath and Cough     After CT contrast injection she felt nauseous and lead to cough and shortness of breath. Radiologist felt this was an allergic reaction. Isovue 370     Recent Labs   Lab Test 06/09/18  0907 11/03/15  1442 10/20/15  1109 09/03/15  0719 08/06/15  1103   *  --   --  152*  --    HDL 51  --   --  56  --    TRIG 260*  --   --  211*  --    ALT  --  44 38  --   --    CR 0.62 0.65 0.53  --  0.62   GFRESTIMATED >90 >90  Non  GFR Calc   >90  Non  GFR Calc    --  >90  Non  GFR Calc     GFRESTBLACK >90 >90   GFR Calc   >90   GFR Calc    --  >90   GFR Calc     POTASSIUM 3.8 3.8 3.5  --  3.7   TSH  --   --  0.64  --  0.83        Mammogram Screening: Patient over age 50, mutual decision to screen reflected in health maintenance.    Pertinent mammograms are reviewed under the imaging tab.  History of abnormal Pap smear: NO - age 30-65 PAP every 5 years with negative HPV co-testing recommended  PAP / HPV Latest Ref Rng & Units 8/6/2015   PAP - NIL   HPV 16 DNA NEG Negative   HPV 18 DNA NEG Negative   OTHER HR HPV NEG Negative     Reviewed and updated as needed this visit by clinical staff  Tobacco  Allergies  Meds  Med Hx  Surg Hx  Fam Hx  Soc Hx        Reviewed and updated as needed this visit by Provider            ROS:  CONSTITUTIONAL: NEGATIVE for fever, chills, change in weight  INTEGUMENTARY/SKIN: NEGATIVE for worrisome rashes, moles or lesions  EYES: NEGATIVE for vision changes or irritation  ENT: NEGATIVE for ear, mouth and throat problems  RESP: NEGATIVE for significant cough or SOB  BREAST: NEGATIVE for masses, tenderness or discharge  CV: NEGATIVE for chest pain, palpitations or peripheral edema  GI: NEGATIVE for nausea, abdominal pain, heartburn, or change in bowel habits  : NEGATIVE for unusual urinary or vaginal symptoms. No  "vaginal bleeding.  MUSCULOSKELETAL: NEGATIVE for significant arthralgias or myalgia  NEURO: NEGATIVE for weakness, dizziness or paresthesias  PSYCHIATRIC: NEGATIVE for changes in mood or affect     OBJECTIVE:   /88   Pulse 105   Temp 99.2  F (37.3  C) (Oral)   Resp 12   Ht 1.492 m (4' 10.75\")   Wt 55.8 kg (123 lb)   LMP 05/29/2008 (Approximate)   SpO2 95%   BMI 25.05 kg/m    EXAM:  GENERAL APPEARANCE: healthy, alert and no distress  EYES: Eyes grossly normal to inspection, PERRL and conjunctivae and sclerae normal  HENT: ear canals and TM's normal, nose and mouth without ulcers or lesions, oropharynx clear and oral mucous membranes moist  NECK: no adenopathy, no asymmetry, masses, or scars and thyroid normal to palpation  RESP: lungs clear to auscultation - no rales, rhonchi or wheezes  CV: regular rates and rhythm, normal S1 S2, no S3 or S4, no murmur, click or rub, no peripheral edema and peripheral pulses strong  ABDOMEN: soft, nontender, no hepatosplenomegaly, no masses and bowel sounds normal  MS: no musculoskeletal defects are noted and gait is age appropriate without ataxia  SKIN: no suspicious lesions or rashes  NEURO: Normal strength and tone, sensory exam grossly normal, mentation intact and speech normal  PSYCH: mentation appears normal and affect normal/bright     Diagnostic Test Results:  Labs reviewed in Epic  No results found for this or any previous visit (from the past 24 hour(s)).    ASSESSMENT/PLAN:       ICD-10-CM    1. Routine general medical examination at a health care facility Z00.00 Some symptoms of hot and cold. Will check thyroid function.    2. Hypertension, goal below 140/90- not well controlled and not taking medication  I10 CBC with platelets     Comprehensive metabolic panel   3. Chronic fatigue R53.82 TSH with free T4 reflex   4. Gallstones K80.20 Comprehensive metabolic panel   5. Lipid screening Z13.220 Lipid panel reflex to direct LDL Non-fasting   6. Routine " "screening for STI (sexually transmitted infection) Z11.3 Hepatitis C Screen Reflex to HCV RNA Quant and Genotype     HIV Screening   7. Urinary frequency R35.0 UA reflex to Microscopic and Culture       COUNSELING:   Reviewed preventive health counseling, as reflected in patient instructions       Regular exercise       Healthy diet/nutrition       Osteoporosis Prevention/Bone Health       (Rosita)menopause management    Estimated body mass index is 25.05 kg/m  as calculated from the following:    Height as of this encounter: 1.492 m (4' 10.75\").    Weight as of this encounter: 55.8 kg (123 lb).         reports that she has never smoked. She has never used smokeless tobacco.      Counseling Resources:  ATP IV Guidelines  Pooled Cohorts Equation Calculator  Breast Cancer Risk Calculator  FRAX Risk Assessment  ICSI Preventive Guidelines  Dietary Guidelines for Americans, 2010  USDA's MyPlate  ASA Prophylaxis  Lung CA Screening    Marixa Gee MD  [facility]  "

## 2019-06-14 NOTE — PATIENT INSTRUCTIONS
Preventive Health Recommendations  Female Ages 50 - 64    Yearly exam: See your health care provider every year in order to  o Review health changes.   o Discuss preventive care.    o Review your medicines if your doctor has prescribed any.      Get a Pap test every three years (unless you have an abnormal result and your provider advises testing more often).    If you get Pap tests with HPV test, you only need to test every 5 years, unless you have an abnormal result.     You do not need a Pap test if your uterus was removed (hysterectomy) and you have not had cancer.    You should be tested each year for STDs (sexually transmitted diseases) if you're at risk.     Have a mammogram every 1 to 2 years.    Have a colonoscopy at age 50, or have a yearly FIT test (stool test). These exams screen for colon cancer.      Have a cholesterol test every 5 years, or more often if advised.    Have a diabetes test (fasting glucose) every three years. If you are at risk for diabetes, you should have this test more often.     If you are at risk for osteoporosis (brittle bone disease), think about having a bone density scan (DEXA).    Shots: Get a flu shot each year. Get a tetanus shot every 10 years.    Nutrition:     Eat at least 5 servings of fruits and vegetables each day.    Eat whole-grain bread, whole-wheat pasta and brown rice instead of white grains and rice.    Get adequate Calcium and Vitamin D.     Lifestyle    Exercise at least 150 minutes a week (30 minutes a day, 5 days a week). This will help you control your weight and prevent disease.    Limit alcohol to one drink per day.    No smoking.     Wear sunscreen to prevent skin cancer.     See your dentist every six months for an exam and cleaning.    See your eye doctor every 1 to 2 years.    At [facility], we strive to deliver an exceptional experience to you, every time we see you.  If you receive a survey in the mail, please send us back your  thoughts. We really do value your feedback.    Based on your medical history, these are the current health maintenance/preventive care services that you are due for (some may have been done at this visit.)  Health Maintenance Due   Topic Date Due     HEPATITIS C SCREENING  1955     HIV SCREENING  08/16/1972     ZOSTER IMMUNIZATION (1 of 2) 08/16/2007     PHQ-2  01/01/2019     PREVENTIVE CARE VISIT  05/29/2019         Suggested websites for health information:  Www.[employer].org : Up to date and easily searchable information on multiple topics.  Www.medlineplus.gov : medication info, interactive tutorials, watch real surgeries online  Www.familydoctor.org : good info from the Academy of Family Physicians  Www.cdc.gov : public health info, travel advisories, epidemics (H1N1)  Www.aap.org : children's health info, normal development, vaccinations  Www.health.state[place].us : [place] dept of health, public health issues in [place], N1N1    Your care team:                            Family Medicine Internal Medicine   MD Tonio Leyva MD Shantel Branch-Fleming, MD Katya Georgiev PA-C Nam Ho, MD Pediatrics   BRICE Edwards, MD Radha Rangel CNP, MD Deborah Mielke, MD Kim Thein, YEVGENIY [facility]      Clinic hours: Monday - Thursday 7 am-7 pm; Fridays 7 am-5 pm.   Urgent care: Monday - Friday 11 am-9 pm; Saturday and Sunday 9 am-5 pm.  Pharmacy : Monday -Thursday 8 am-8 pm; Friday 8 am-6 pm; Saturday and Sunday 9 am-5 pm.     Clinic: [phone]   Pharmacy: [phone]    Patient Education     Ki?m Soát v? Áp Dionicio?t Dominguez  Áp dionicio?t dominguez (hypertension) ???c g?i là k? gi?t ng??i th?m l?ng. Ch? vì có terry?u ng??i không bi?t ?i?u này. Áp dionicio?t bình th??ng d??i 120/80. Bi?t rõ v? áp dionicio?t c?a quý v? và nh? ki?m tra áp dionicio?t th??marie paz. Làm nh? v?y có th? c?u ???c tính m?ng c?a quý v?.    Ch?n các th?c ?n lành m?nh cho ara.?n b?t mu?i và dajuan ch? ??  ?n u?ng DASH (DASH diet).    Ch?n các th?c ph?m ít mu?i, ít ch?t béo.    Gi?i h?n ?n th?c ph?m ?óng h?p, làm khô, ?ã x? lý, ?óng gói, và ?n nhanh. Các lo?i th?c ph?m này có r?t terry?u mu?i.    ?n t? 8-10 ph?n ?n trái cây và delmis m?i ngày.    Ch?n th?t n?c, cá, ho?c gà.    ?n mì s?i nguyên h?t, c?m, và ??u.    ?n t? 2-3 ph?n ?n các s?n ph?m s?a ít béo ho?c không béo.    H?i bác s? quý v? v? ch??ng trình ?n DASH. Ch??ng trình này giúp làm gi?m áp brunilda?t.     Vi?c quá n?ng cân không t?t cho ara c?a quý v?. Ozzie trì m?t cân l??ng lành m?nh.    H?i nhân viên y t? ?n saturnino nhiêu ca-lo-ri (calories) m?t ngày. Jovita ?ó dajuan ?úng con s? ?ó.    H?i nhân viên y t? m?c cân l??ng nào lành m?nh nh?t cho quý v?. N?u quý v? quá n?ng cân, chô? c?n s?t 10 cân Lexy là có th? giúp làm h? áp brunilda?t.    Gi?i h?n ?n v?t và ?n ch?t ng?t.    T?p th? d?c ??u ??n.    C?ng gi?ng nh? các b?p th?t nào khác, ara c?n t?p th? d?c ?? gi? cho kh?e m?nh. Ng?i d?y và ho?t ??ng.    Ch?n ho?t ??ng mà quý v? ?a thích. R? hoang ?ình và b?n bè cùng kain hoang.    ?i b? hay ?i xe ??p thay vì lái xe. Ho?c ??u xa h?n so v?i các l?i vào tòa nhà.    Dùng b?c franny thay vì ?i franny máy.    Cào lá, làm v??n, ho?c s?a ch?a soumya nhà.    Ho?t ??ng ít nh?t là 30 phút m?i ngày, terry?u ngày soumya tu?n.  Vi?c b? c?ng th?ng ti?p di?n có th? làm t?ng brunilda?t áp.    Dành thì gi? ?? th? giãn và t?n h??ng cu?c s?ng. Dành th?i jeferson ?? c??i ?ùa.    Th?m vi?ng hoang ?ình và b?n bè, và gi? l?y các s? thích.  Dùng quá terry?u r??u làm t?ng áp brunilda?t.    ?àn ông: Không ???c u?ng quá 2 ly m?t ngày.    Ph? n?: Không ???c u?ng quá 1 ly m?t ngày.  Hút thu?c lá s? làm hoang t?ng c? nguy b? b?nh ara và ??t q?y c?a quý v?. Bàn v?i nhân viên y t? v? vi?c mack thu?c lá. H?i xem các ch??ng trình nào t?i ??a ph??ng ho?c c?ng ??ng có th? giúp ?? ???c.  N?u các thay ??i v? l?i s?ng không ??, thì nhân viên y t? c?a quý v? có th? cho dùng thu?c tr? áp brunilda?t aponte. Dùng h?t các lo?i thu?c nh? ?ã joce ??nh.   Date Last  Reviewed: 4/27/2016 2000-2018 [employer] [address], [place] [address]. T?t c? các joce?n ???c b?o l?u. Thông tin này không nh?m thay th? cho d?ch v? ch?m sóc y t? ike alan henry môn. C?n phan good dajuan s? ch? d?n t? chuyên hoang ch?m sóc s?c raisa? c?a quý v?.

## 2019-06-16 LAB — HCV AB SERPL QL IA: NONREACTIVE

## 2019-06-17 LAB — HIV 1+2 AB+HIV1 P24 AG SERPL QL IA: NONREACTIVE

## 2019-06-17 NOTE — RESULT ENCOUNTER NOTE
Dear Janet    Your test results are attached. I am happy to let you know that they are stable.    The blood sugar is normal and you do not have diabetes. The thyroid test is normal. The screen for infections was negative. The cholesterol looks good for you. We can recheck labs in 1 year.     Please contact me by [employer] if you have any questions about your labs or management.    Marixa Gee MD

## 2019-06-21 ENCOUNTER — OFFICE VISIT (OUTPATIENT)
Dept: FAMILY MEDICINE | Facility: CLINIC | Age: 64
End: 2019-06-21
Payer: COMMERCIAL

## 2019-06-21 ENCOUNTER — ANCILLARY PROCEDURE (OUTPATIENT)
Dept: GENERAL RADIOLOGY | Facility: CLINIC | Age: 64
End: 2019-06-21
Attending: NURSE PRACTITIONER
Payer: COMMERCIAL

## 2019-06-21 VITALS
HEART RATE: 104 BPM | TEMPERATURE: 98.7 F | RESPIRATION RATE: 16 BRPM | WEIGHT: 122 LBS | SYSTOLIC BLOOD PRESSURE: 144 MMHG | DIASTOLIC BLOOD PRESSURE: 84 MMHG | HEIGHT: 59 IN | BODY MASS INDEX: 24.6 KG/M2 | OXYGEN SATURATION: 97 %

## 2019-06-21 DIAGNOSIS — R05.9 COUGH: ICD-10-CM

## 2019-06-21 DIAGNOSIS — R61 NIGHT SWEATS: ICD-10-CM

## 2019-06-21 DIAGNOSIS — J40 BRONCHITIS: Primary | ICD-10-CM

## 2019-06-21 PROCEDURE — 71046 X-RAY EXAM CHEST 2 VIEWS: CPT

## 2019-06-21 PROCEDURE — 99214 OFFICE O/P EST MOD 30 MIN: CPT | Performed by: NURSE PRACTITIONER

## 2019-06-21 RX ORDER — DOXYCYCLINE 100 MG/1
100 CAPSULE ORAL 2 TIMES DAILY
Qty: 20 CAPSULE | Refills: 0 | Status: SHIPPED | OUTPATIENT
Start: 2019-06-21 | End: 2019-07-01

## 2019-06-21 ASSESSMENT — MIFFLIN-ST. JEOR: SCORE: 1020.05

## 2019-06-21 ASSESSMENT — PAIN SCALES - GENERAL: PAINLEVEL: NO PAIN (0)

## 2019-06-21 NOTE — PATIENT INSTRUCTIONS
Push fluids, rest  Start antibiotic today. Know that your cough may not be completely gone when you're done with your antibiotics  You can use a humidifier by your bed at night. Distilled water should be used with the humidifier.  Tylenol or ibuprofen as needed for pain or fever  Follow up if you are worsening or not improving in 5 days, sooner if worsening    At [facility], we strive to deliver an exceptional experience to you, every time we see you.  If you receive a survey in the mail, please send us back your thoughts. We really do value your feedback.    Based on your medical history, these are the current health maintenance/preventive care services that you are due for (some may have been done at this visit.)  Health Maintenance Due   Topic Date Due     ZOSTER IMMUNIZATION (1 of 2) 08/16/2007         Suggested websites for health information:  Www.[employer] Data : Up to date and easily searchable information on multiple topics.  Www.[employer].gov : medication info, interactive tutorials, watch real surgeries online  Www.familydoctor.org : good info from the Academy of Family Physicians  Www.cdc.gov : public health info, travel advisories, epidemics (H1N1)  Www.aap.org : children's health info, normal development, vaccinations  Www.health.[facility].[place].us : [place] dept of health, public health issues in [place], N1N1    Your care team:                            Family Medicine Internal Medicine   MD Tonio Leyva MD Shantel Branch-Fleming, MD Katya Georgiev PA-C Nam Ho, MD Pediatrics   BRICE Edwards, MARY VUONG CNP   MD Radha Chin MD Deborah Mielke, MD Kim Thein, APRN CNP      Clinic hours: Monday - Thursday 7 am-7 pm; Fridays 7 am-5 pm.   Urgent care: Monday - Friday 11 am-9 pm; Saturday and Sunday 9 am-5 pm.  Pharmacy : Monday -Thursday 8 am-8 pm; Friday 8 am-6 pm; Saturday and Sunday 9 am-5 pm.     Clinic: [phone]   Pharmacy: [phone]

## 2019-06-21 NOTE — PROGRESS NOTES
Subjective     Janet Giordano is a 63 year old female who presents to clinic today for the following health issues:    HPI   Acute Illness   Acute illness concerns: cough, fever  Onset: one week    Fever: YES    Chills/Sweats: YES    Headache (location?): YES, mild    Sinus Pressure:no    Conjunctivitis:  no    Ear Pain: no    Rhinorrhea: no    Congestion: no    Sore Throat: no     Cough: YES    Wheeze: no    Decreased Appetite: YES    Nausea: YES    Vomiting: no     Diarrhea:  No, but stools are looser     Dysuria/Freq.: no     Fatigue/Achiness: YES    Sick/Strep Exposure: no      Therapies Tried and outcome: otc cough syrup ? Name?    Tactile fever.   Cough is dry  No chest pain, shortness of breath, or wheezing  Sweating at night    Patient Active Problem List   Diagnosis     Hypertension, goal below 140/90     CARDIOVASCULAR SCREENING; LDL GOAL LESS THAN 130     Chronic fatigue     Gastroesophageal reflux disease with esophagitis     Immigrant with language difficulty     Advanced directives, counseling/discussion     Helicobacter pylori infection     Gallstones     Past Surgical History:   Procedure Laterality Date     ESOPHAGOSCOPY, GASTROSCOPY, DUODENOSCOPY (EGD), COMBINED N/A 10/23/2015    Procedure: COMBINED ESOPHAGOSCOPY, GASTROSCOPY, DUODENOSCOPY (EGD);  Surgeon: Duane, William Charles, MD;  Location:  OR     ESOPHAGOSCOPY, GASTROSCOPY, DUODENOSCOPY (EGD), COMBINED N/A 10/23/2015    Procedure: COMBINED ESOPHAGOSCOPY, GASTROSCOPY, DUODENOSCOPY (EGD), BIOPSY SINGLE OR MULTIPLE;  Surgeon: Duane, William Charles, MD;  Location:  OR       Social History     Tobacco Use     Smoking status: Never Smoker     Smokeless tobacco: Never Used   Substance Use Topics     Alcohol use: No     Alcohol/week: 0.0 oz     History reviewed. No pertinent family history.      Current Outpatient Medications   Medication Sig Dispense Refill     Cholecalciferol (VITAMIN D3 PO) Take 600 Units by mouth        doxycycline hyclate  "(VIBRAMYCIN) 100 MG capsule Take 1 capsule (100 mg) by mouth 2 times daily for 10 days 20 capsule 0     fish oil-omega-3 fatty acids 1000 MG capsule Take 2 g by mouth daily       Glucosamine-Chondroitin (GLUCOSAMINE CHONDR COMPLEX PO)        Multiple Vitamins-Minerals (PRESERVISION AREDS 2 PO)        order for DME BP cuff with supplies, brand as covered by insurance.  Dx: HTN 1 each 0     vitamin B complex with vitamin C (VITAMIN  B COMPLEX) tablet Take 1 tablet by mouth daily       Allergies   Allergen Reactions     Contrast Dye Shortness Of Breath and Cough     After CT contrast injection she felt nauseous and lead to cough and shortness of breath. Radiologist felt this was an allergic reaction. Isovue 370       Reviewed and updated as needed this visit by Provider  Tobacco  Allergies  Meds  Problems  Med Hx  Surg Hx  Fam Hx         Review of Systems   ROS COMP: Constitutional, HEENT, cardiovascular, pulmonary, gi and gu systems are negative, except as otherwise noted.      Objective    /84   Pulse 104   Temp 98.7  F (37.1  C) (Oral)   Resp 16   Ht 1.492 m (4' 10.75\")   Wt 55.3 kg (122 lb)   LMP 05/29/2008 (Approximate)   SpO2 97%   BMI 24.85 kg/m    Body mass index is 24.85 kg/m .  Physical Exam   GENERAL: healthy, alert and no distress  EYES: Eyes grossly normal to inspection, PERRL and conjunctivae and sclerae normal  HENT: ear canals and TM's normal, nose and mouth without ulcers or lesions  NECK: no adenopathy, no asymmetry, masses, or scars and thyroid normal to palpation  RESP: lungs clear to auscultation - no rales, rhonchi or wheezes  CV: regular rate and rhythm, normal S1 S2, no S3 or S4, no murmur, click or rub, no peripheral edema and peripheral pulses strong  MS: no gross musculoskeletal defects noted, no edema  PSYCH: mentation appears normal, affect normal/bright    Diagnostic Test Results:  Xray -     XR CHEST 2 VW 6/21/2019 9:03 AM      HISTORY: cough, night sweats; Cough; " Night sweats     COMPARISON: 10/20/2015                                                                      IMPRESSION: The cardiac silhouette and pulmonary vasculature are  within normal limits. The lungs are clear.     TERI WARNER MD        Assessment & Plan       ICD-10-CM    1. Bronchitis J40 XR Chest 2 Views     doxycycline hyclate (VIBRAMYCIN) 100 MG capsule   2. Night sweats R61 XR Chest 2 Views   3. Cough R05       Push fluids, rest  Start antibiotic today. Know that your cough may not be completely gone when you're done with your antibiotics  You can use a humidifier by your bed at night. Distilled water should be used with the humidifier.  Tylenol or ibuprofen as needed for pain or fever  Follow up if you are worsening or not improving in 5 days, sooner if worsening    See Patient Instructions    Return in about 5 days (around 6/26/2019), or if symptoms worsen or fail to improve.    YEVGENIY Martin [facility]

## 2019-06-24 ENCOUNTER — MYC MEDICAL ADVICE (OUTPATIENT)
Dept: FAMILY MEDICINE | Facility: CLINIC | Age: 64
End: 2019-06-24

## 2019-06-24 NOTE — LETTER
June 24, 2019      Janet Giordano  [address]  [place] [address]        To Whom It May Concern:    Janet Giordano was seen on 6/21/2019 for an illness that affected her much of last week.  Please excuse her until 24 hours after fever is gone.        Sincerely,        YEVGENIY Martin CNP

## 2019-06-25 NOTE — TELEPHONE ENCOUNTER
Written letter will be deliver to the  this afternoon for pickup after 3p.  Rusty Soni,  [facility]    Sent my chart message to notify patient of letter.  Rusty Soni,  [facility]    NOTE: If patient and or guardians calls the clinic before the care teams gets a chance to call them, please notify the caller the above information regarding pickup times, remind them to bring a photo ID, document and close the encounter.  Rusty Soni,  [facility]    FYI:  Anything completed after 2:00p will not be delivered until the next business day after 3p.   Rusty Soni,  for [facility].

## 2020-02-24 ENCOUNTER — HEALTH MAINTENANCE LETTER (OUTPATIENT)
Age: 65
End: 2020-02-24

## 2020-03-18 ENCOUNTER — VIRTUAL VISIT (OUTPATIENT)
Dept: FAMILY MEDICINE | Facility: CLINIC | Age: 65
End: 2020-03-18
Payer: COMMERCIAL

## 2020-03-18 DIAGNOSIS — Z53.9 ERRONEOUS ENCOUNTER--DISREGARD: Primary | ICD-10-CM

## 2020-12-13 ENCOUNTER — HEALTH MAINTENANCE LETTER (OUTPATIENT)
Age: 65
End: 2020-12-13

## 2021-03-24 ASSESSMENT — ENCOUNTER SYMPTOMS
FREQUENCY: 0
SORE THROAT: 0
WEAKNESS: 0
EYE PAIN: 0
DIZZINESS: 0
NAUSEA: 0
HEMATOCHEZIA: 0
DIARRHEA: 0
NERVOUS/ANXIOUS: 0
BREAST MASS: 0
HEMATURIA: 0
CHILLS: 0
COUGH: 0
FEVER: 0
MYALGIAS: 0
DYSURIA: 0
JOINT SWELLING: 0
PALPITATIONS: 0
SHORTNESS OF BREATH: 0
CONSTIPATION: 0
HEARTBURN: 0
HEADACHES: 0
ARTHRALGIAS: 0
ABDOMINAL PAIN: 0

## 2021-03-31 ENCOUNTER — OFFICE VISIT (OUTPATIENT)
Dept: FAMILY MEDICINE | Facility: CLINIC | Age: 66
End: 2021-03-31
Payer: COMMERCIAL

## 2021-03-31 VITALS
DIASTOLIC BLOOD PRESSURE: 95 MMHG | BODY MASS INDEX: 25.2 KG/M2 | HEART RATE: 104 BPM | HEIGHT: 59 IN | TEMPERATURE: 98.5 F | OXYGEN SATURATION: 98 % | SYSTOLIC BLOOD PRESSURE: 165 MMHG | WEIGHT: 125 LBS

## 2021-03-31 DIAGNOSIS — Z12.4 SCREENING FOR MALIGNANT NEOPLASM OF CERVIX: ICD-10-CM

## 2021-03-31 DIAGNOSIS — Z12.31 ENCOUNTER FOR SCREENING MAMMOGRAM FOR BREAST CANCER: ICD-10-CM

## 2021-03-31 DIAGNOSIS — I10 ESSENTIAL HYPERTENSION WITH GOAL BLOOD PRESSURE LESS THAN 140/90: ICD-10-CM

## 2021-03-31 DIAGNOSIS — Z13.6 CARDIOVASCULAR SCREENING; LDL GOAL LESS THAN 130: ICD-10-CM

## 2021-03-31 DIAGNOSIS — Z13.1 SCREENING FOR DIABETES MELLITUS: ICD-10-CM

## 2021-03-31 DIAGNOSIS — Z00.00 ROUTINE GENERAL MEDICAL EXAMINATION AT A HEALTH CARE FACILITY: Primary | ICD-10-CM

## 2021-03-31 PROCEDURE — 87624 HPV HI-RISK TYP POOLED RSLT: CPT | Performed by: FAMILY MEDICINE

## 2021-03-31 PROCEDURE — G0145 SCR C/V CYTO,THINLAYER,RESCR: HCPCS | Performed by: FAMILY MEDICINE

## 2021-03-31 PROCEDURE — 99396 PREV VISIT EST AGE 40-64: CPT | Performed by: FAMILY MEDICINE

## 2021-03-31 RX ORDER — BLOOD PRESSURE TEST KIT
KIT MISCELLANEOUS
Qty: 1 KIT | Refills: 0 | Status: SHIPPED | OUTPATIENT
Start: 2021-03-31 | End: 2023-04-12

## 2021-03-31 RX ORDER — LISINOPRIL 20 MG/1
20 TABLET ORAL DAILY
Qty: 90 TABLET | Refills: 3 | Status: SHIPPED | OUTPATIENT
Start: 2021-03-31 | End: 2021-05-06

## 2021-03-31 ASSESSMENT — PAIN SCALES - GENERAL: PAINLEVEL: NO PAIN (0)

## 2021-03-31 ASSESSMENT — MIFFLIN-ST. JEOR: SCORE: 1023.66

## 2021-03-31 NOTE — PROGRESS NOTES
SUBJECTIVE:   CC: Janet Giordano is an 65 year old woman who presents for preventive health visit.       Patient has been advised of split billing requirements and indicates understanding: Yes  Healthy Habits:     Getting at least 3 servings of Calcium per day:  Yes    Bi-annual eye exam:  Yes    Dental care twice a year:  Yes    Sleep apnea or symptoms of sleep apnea:  None    Diet:  Low fat/cholesterol and Vegetarian/vegan    Frequency of exercise:  6-7 days/week    Duration of exercise:  30-45 minutes    Taking medications regularly:  Yes    Medication side effects:  None    PHQ-2 Total Score: 0    Additional concerns today:  No        Today's PHQ-2 Score:   PHQ-2 ( 1999 [employer]) 3/24/2021   Q1: Little interest or pleasure in doing things 0   Q2: Feeling down, depressed or hopeless 0   PHQ-2 Score 0   Q1: Little interest or pleasure in doing things Not at all   Q2: Feeling down, depressed or hopeless Not at all   PHQ-2 Score 0       Abuse: Current or Past (Physical, Sexual or Emotional) - No  Do you feel safe in your environment? Yes    Have you ever done Advance Care Planning? (For example, a Health Directive, POLST, or a discussion with a medical provider or your loved ones about your wishes): Yes, advance care planning is on file.    Social History     Tobacco Use     Smoking status: Never Smoker     Smokeless tobacco: Never Used   Substance Use Topics     Alcohol use: No     Alcohol/week: 0.0 standard drinks     If you drink alcohol do you typically have >3 drinks per day or >7 drinks per week? No    Alcohol Use 3/24/2021   Prescreen: >3 drinks/day or >7 drinks/week? No   Prescreen: >3 drinks/day or >7 drinks/week? -   No flowsheet data found.    Reviewed orders with patient.  Reviewed health maintenance and updated orders accordingly - Yes  Lab work is in process  Labs reviewed in EPIC  BP Readings from Last 3 Encounters:   03/31/21 (!) 165/95   06/21/19 144/84   06/14/19 150/88    Wt Readings from Last 3  Encounters:   03/31/21 56.7 kg (125 lb)   06/21/19 55.3 kg (122 lb)   06/14/19 55.8 kg (123 lb)                  Patient Active Problem List   Diagnosis     Hypertension, goal below 140/90     CARDIOVASCULAR SCREENING; LDL GOAL LESS THAN 130     Chronic fatigue     Gastroesophageal reflux disease with esophagitis     Immigrant with language difficulty     Advanced directives, counseling/discussion     Helicobacter pylori infection     Gallstones     Past Surgical History:   Procedure Laterality Date     ESOPHAGOSCOPY, GASTROSCOPY, DUODENOSCOPY (EGD), COMBINED N/A 10/23/2015    Procedure: COMBINED ESOPHAGOSCOPY, GASTROSCOPY, DUODENOSCOPY (EGD);  Surgeon: Duane, William Charles, MD;  Location: [facility] OR     ESOPHAGOSCOPY, GASTROSCOPY, DUODENOSCOPY (EGD), COMBINED N/A 10/23/2015    Procedure: COMBINED ESOPHAGOSCOPY, GASTROSCOPY, DUODENOSCOPY (EGD), BIOPSY SINGLE OR MULTIPLE;  Surgeon: Duane, William Charles, MD;  Location: [facility] OR       Social History     Tobacco Use     Smoking status: Never Smoker     Smokeless tobacco: Never Used   Substance Use Topics     Alcohol use: No     Alcohol/week: 0.0 standard drinks     History reviewed. No pertinent family history.      Current Outpatient Medications   Medication Sig Dispense Refill     Blood Pressure KIT Check blood pressure as needed. 1 kit 0     Cholecalciferol (VITAMIN D3 PO) Take 600 Units by mouth        fish oil-omega-3 fatty acids 1000 MG capsule Take 2 g by mouth daily       lisinopril (ZESTRIL) 20 MG tablet Take 1 tablet (20 mg) by mouth daily For blood pressure. 90 tablet 3     Multiple Vitamins-Minerals (PRESERVISION AREDS 2 PO)        vitamin B complex with vitamin C (VITAMIN  B COMPLEX) tablet Take 1 tablet by mouth daily       Allergies   Allergen Reactions     Contrast Dye Shortness Of Breath and Cough     After CT contrast injection she felt nauseous and lead to cough and shortness of breath. Radiologist felt this was an allergic reaction. Isovue 370  "      Breast Cancer Screening:    Breast CA Risk Assessment (FHS-7) 3/24/2021   Do you have a family history of breast, colon, or ovarian cancer? No / Unknown         History of abnormal Pap smear: NO - age 30-65 PAP every 5 years with negative HPV co-testing recommended  PAP / HPV Latest Ref Rng & Units 8/6/2015   PAP - NIL   HPV 16 DNA NEG Negative   HPV 18 DNA NEG Negative   OTHER HR HPV NEG Negative     Reviewed and updated as needed this visit by clinical staff                 Reviewed and updated as needed this visit by Provider                    Review of Systems  CONSTITUTIONAL: NEGATIVE for fever, chills, change in weight  INTEGUMENTARY/SKIN: NEGATIVE for worrisome rashes, moles or lesions  EYES: NEGATIVE for vision changes or irritation  ENT: NEGATIVE for ear, mouth and throat problems  RESP: NEGATIVE for significant cough or SOB  BREAST: NEGATIVE for masses, tenderness or discharge  CV: NEGATIVE for chest pain, palpitations or peripheral edema  GI: NEGATIVE for nausea, abdominal pain, heartburn, or change in bowel habits  : NEGATIVE for unusual urinary or vaginal symptoms. No vaginal bleeding.  MUSCULOSKELETAL: NEGATIVE for significant arthralgias or myalgia  NEURO: NEGATIVE for weakness, dizziness or paresthesias  PSYCHIATRIC: NEGATIVE for changes in mood or affect      OBJECTIVE:   BP (!) 165/95   Pulse 104   Temp 98.5  F (36.9  C) (Tympanic)   Ht 1.492 m (4' 10.75\")   Wt 56.7 kg (125 lb)   LMP 05/29/2008 (Approximate)   SpO2 98%   BMI 25.46 kg/m    Physical Exam  GENERAL: healthy, alert and no distress  NECK: no adenopathy, no asymmetry, masses, or scars and thyroid normal to palpation  RESP: lungs clear to auscultation - no rales, rhonchi or wheezes  CV: regular rate and rhythm, normal S1 S2, no S3 or S4, no murmur, click or rub, no peripheral edema and peripheral pulses strong  ABDOMEN: soft, nontender, no hepatosplenomegaly, no masses and bowel sounds normal  MS: no gross musculoskeletal " "defects noted, no edema    Diagnostic Test Results:  Labs reviewed in Epic    ASSESSMENT/PLAN:   1. Routine general medical examination at a health care facility  As below.    2. Essential hypertension with goal blood pressure less than 140/90  Not controlled. Start lisinopril 20 mg daily. Recheck in 1 month. Patient will monitor bp's at home.  - Comprehensive metabolic panel (BMP + Alb, Alk Phos, ALT, AST, Total. Bili, TP); Future  - Albumin Random Urine Quantitative with Creat Ratio; Future  - lisinopril (ZESTRIL) 20 MG tablet; Take 1 tablet (20 mg) by mouth daily For blood pressure.  Dispense: 90 tablet; Refill: 3  - Blood Pressure KIT; Check blood pressure as needed.  Dispense: 1 kit; Refill: 0    3. CARDIOVASCULAR SCREENING; LDL GOAL LESS THAN 130    - Comprehensive metabolic panel (BMP + Alb, Alk Phos, ALT, AST, Total. Bili, TP); Future  - Lipid panel reflex to direct LDL Fasting; Future    4. Screening for diabetes mellitus    - Comprehensive metabolic panel (BMP + Alb, Alk Phos, ALT, AST, Total. Bili, TP); Future    5. Screening for malignant neoplasm of cervix    - Pap imaged thin layer screen with HPV - recommended age 30 - 65 years (select HPV order below)  - HPV High Risk Types DNA Cervical    6. Encounter for screening mammogram for breast cancer    - *MA Screening Digital Bilateral; Future    Patient has been advised of split billing requirements and indicates understanding: Yes  COUNSELING:  Reviewed preventive health counseling, as reflected in patient instructions       Regular exercise       Healthy diet/nutrition       Vision screening    Estimated body mass index is 24.85 kg/m  as calculated from the following:    Height as of 6/21/19: 1.492 m (4' 10.75\").    Weight as of 6/21/19: 55.3 kg (122 lb).        She reports that she has never smoked. She has never used smokeless tobacco.      Counseling Resources:  ATP IV Guidelines  Pooled Cohorts Equation Calculator  Breast Cancer Risk " Calculator  BRCA-Related Cancer Risk Assessment: FHS-7 Tool  FRAX Risk Assessment  ICSI Preventive Guidelines  Dietary Guidelines for Americans, 2010  USDA's MyPlate  ASA Prophylaxis  Lung CA Screening    Samuel Torrez MD, MD  [facility]

## 2021-03-31 NOTE — PATIENT INSTRUCTIONS
At [facility], we strive to deliver an exceptional experience to you, every time we see you. If you receive a survey, please complete it as we do value your feedback.  If you have MyChart, you can expect to receive results automatically within 24 hours of their completion.  Your provider will send a note interpreting your results as well.   If you do not have MyChart, you should receive your results in about a week by mail.    Your care team:                            Family Medicine Internal Medicine   MD Tonio Leyva MD Shantel Branch-Fleming, MD Srinivasa Vaka, MD Katya Belousova, PADelphineC  Holly Harden, APRN CNP    Samuel Torrez, MD Pediatrics   Geovany Barkley, PADEUCE Talley, CNP MD Abigail Kyle APRN CNP   MD Radha Chin MD Deborah Mielke, MD Grisel Sanchez, APRN [facility]      Clinic hours: Monday - Thursday 7 am-6 pm; Fridays 7 am-5 pm.   Urgent care: Monday - Friday 11 am-9 pm; Saturday and Sunday 9 am-5 pm.    Clinic: [phone]       [facility]: Monday - Thursday 8 am - 7 pm; Friday 8 am - 6 pm  [facility]: [phone]     Use www.[email or web address] for 24/7 diagnosis and treatment of dozens of conditions.  Preventive Health Recommendations  Female Ages 50 - 64    Yearly exam: See your health care provider every year in order to  o Review health changes.   o Discuss preventive care.    o Review your medicines if your doctor has prescribed any.      Get a Pap test every three years (unless you have an abnormal result and your provider advises testing more often).    If you get Pap tests with HPV test, you only need to test every 5 years, unless you have an abnormal result.     You do not need a Pap test if your uterus was removed (hysterectomy) and you have not had cancer.    You should be tested each year for STDs (sexually transmitted diseases) if you're at risk.      Have a mammogram every 1 to 2 years.    Have a colonoscopy at age 50, or have a yearly FIT test (stool test). These exams screen for colon cancer.      Have a cholesterol test every 5 years, or more often if advised.    Have a diabetes test (fasting glucose) every three years. If you are at risk for diabetes, you should have this test more often.     If you are at risk for osteoporosis (brittle bone disease), think about having a bone density scan (DEXA).    Shots: Get a flu shot each year. Get a tetanus shot every 10 years.    Nutrition:     Eat at least 5 servings of fruits and vegetables each day.    Eat whole-grain bread, whole-wheat pasta and brown rice instead of white grains and rice.    Get adequate Calcium and Vitamin D.     Lifestyle    Exercise at least 150 minutes a week (30 minutes a day, 5 days a week). This will help you control your weight and prevent disease.    Limit alcohol to one drink per day.    No smoking.     Wear sunscreen to prevent skin cancer.     See your dentist every six months for an exam and cleaning.    See your eye doctor every 1 to 2 years.

## 2021-04-03 ENCOUNTER — ANCILLARY PROCEDURE (OUTPATIENT)
Dept: MAMMOGRAPHY | Facility: CLINIC | Age: 66
End: 2021-04-03
Attending: FAMILY MEDICINE
Payer: COMMERCIAL

## 2021-04-03 DIAGNOSIS — I10 ESSENTIAL HYPERTENSION WITH GOAL BLOOD PRESSURE LESS THAN 140/90: ICD-10-CM

## 2021-04-03 DIAGNOSIS — Z13.1 SCREENING FOR DIABETES MELLITUS: ICD-10-CM

## 2021-04-03 DIAGNOSIS — Z13.6 CARDIOVASCULAR SCREENING; LDL GOAL LESS THAN 130: ICD-10-CM

## 2021-04-03 DIAGNOSIS — Z12.31 ENCOUNTER FOR SCREENING MAMMOGRAM FOR BREAST CANCER: ICD-10-CM

## 2021-04-03 PROCEDURE — 82043 UR ALBUMIN QUANTITATIVE: CPT | Performed by: FAMILY MEDICINE

## 2021-04-03 PROCEDURE — 80053 COMPREHEN METABOLIC PANEL: CPT | Performed by: FAMILY MEDICINE

## 2021-04-03 PROCEDURE — 80061 LIPID PANEL: CPT | Performed by: FAMILY MEDICINE

## 2021-04-03 PROCEDURE — 77067 SCR MAMMO BI INCL CAD: CPT | Mod: TC | Performed by: RADIOLOGY

## 2021-04-03 PROCEDURE — 36415 COLL VENOUS BLD VENIPUNCTURE: CPT | Performed by: FAMILY MEDICINE

## 2021-04-05 LAB
ALBUMIN SERPL-MCNC: 3.9 G/DL (ref 3.4–5)
ALP SERPL-CCNC: 123 U/L (ref 40–150)
ALT SERPL W P-5'-P-CCNC: 40 U/L (ref 0–50)
ANION GAP SERPL CALCULATED.3IONS-SCNC: 5 MMOL/L (ref 3–14)
AST SERPL W P-5'-P-CCNC: 21 U/L (ref 0–45)
BILIRUB SERPL-MCNC: 0.5 MG/DL (ref 0.2–1.3)
BUN SERPL-MCNC: 11 MG/DL (ref 7–30)
CALCIUM SERPL-MCNC: 9 MG/DL (ref 8.5–10.1)
CHLORIDE SERPL-SCNC: 107 MMOL/L (ref 94–109)
CHOLEST SERPL-MCNC: 290 MG/DL
CO2 SERPL-SCNC: 26 MMOL/L (ref 20–32)
COPATH REPORT: NORMAL
CREAT SERPL-MCNC: 0.64 MG/DL (ref 0.52–1.04)
CREAT UR-MCNC: 50 MG/DL
GFR SERPL CREATININE-BSD FRML MDRD: >90 ML/MIN/{1.73_M2}
GLUCOSE SERPL-MCNC: 103 MG/DL (ref 70–99)
HDLC SERPL-MCNC: 47 MG/DL
LDLC SERPL CALC-MCNC: 172 MG/DL
MICROALBUMIN UR-MCNC: 8 MG/L
MICROALBUMIN/CREAT UR: 15.33 MG/G CR (ref 0–25)
NONHDLC SERPL-MCNC: 243 MG/DL
PAP: NORMAL
POTASSIUM SERPL-SCNC: 3.9 MMOL/L (ref 3.4–5.3)
PROT SERPL-MCNC: 7.7 G/DL (ref 6.8–8.8)
SODIUM SERPL-SCNC: 138 MMOL/L (ref 133–144)
TRIGL SERPL-MCNC: 355 MG/DL

## 2021-04-07 LAB
FINAL DIAGNOSIS: NORMAL
HPV HR 12 DNA CVX QL NAA+PROBE: NEGATIVE
HPV16 DNA SPEC QL NAA+PROBE: NEGATIVE
HPV18 DNA SPEC QL NAA+PROBE: NEGATIVE
SPECIMEN DESCRIPTION: NORMAL
SPECIMEN SOURCE CVX/VAG CYTO: NORMAL

## 2021-04-27 ENCOUNTER — MYC MEDICAL ADVICE (OUTPATIENT)
Dept: FAMILY MEDICINE | Facility: CLINIC | Age: 66
End: 2021-04-27

## 2021-05-06 ENCOUNTER — OFFICE VISIT (OUTPATIENT)
Dept: FAMILY MEDICINE | Facility: CLINIC | Age: 66
End: 2021-05-06
Payer: COMMERCIAL

## 2021-05-06 VITALS
SYSTOLIC BLOOD PRESSURE: 163 MMHG | HEART RATE: 86 BPM | OXYGEN SATURATION: 97 % | BODY MASS INDEX: 25.2 KG/M2 | HEIGHT: 59 IN | DIASTOLIC BLOOD PRESSURE: 83 MMHG | WEIGHT: 125 LBS

## 2021-05-06 DIAGNOSIS — I10 ESSENTIAL HYPERTENSION WITH GOAL BLOOD PRESSURE LESS THAN 140/90: Primary | ICD-10-CM

## 2021-05-06 PROCEDURE — 99214 OFFICE O/P EST MOD 30 MIN: CPT | Performed by: FAMILY MEDICINE

## 2021-05-06 RX ORDER — AMLODIPINE BESYLATE 2.5 MG/1
2.5 TABLET ORAL DAILY
Qty: 30 TABLET | Refills: 11 | Status: SHIPPED | OUTPATIENT
Start: 2021-05-06 | End: 2022-02-08

## 2021-05-06 ASSESSMENT — MIFFLIN-ST. JEOR: SCORE: 1023.66

## 2021-05-06 ASSESSMENT — PAIN SCALES - GENERAL: PAINLEVEL: NO PAIN (0)

## 2021-05-06 NOTE — PROGRESS NOTES
"    Subjective   DARIAN ALANIZ is a 65 year old who presents for the following health issues:    HPI     Hypertension Follow-up      Do you check your blood pressure regularly outside of the clinic? Yes     Are you following a low salt diet? Yes    Are your blood pressures ever more than 140 on the top number (systolic) OR more   than 90 on the bottom number (diastolic), for example 140/90? Yes      How many servings of fruits and vegetables do you eat daily?  2-3    On average, how many sweetened beverages do you drink each day (Examples: soda, juice, sweet tea, etc.  Do NOT count diet or artificially sweetened beverages)?   0    How many days per week do you exercise enough to make your heart beat faster? 5    How many minutes a day do you exercise enough to make your heart beat faster? 20 - 29    How many days per week do you miss taking your medication? Stop taking medication due to side effects      Review of Systems   Constitutional, HEENT, cardiovascular, pulmonary, GI, , musculoskeletal, neuro, skin, endocrine and psych systems are negative, except as otherwise noted.      Objective    BP (!) 157/91 (BP Location: Left arm, Patient Position: Chair, Cuff Size: Adult Regular)   Pulse 86   Ht 1.492 m (4' 10.75\")   Wt 56.7 kg (125 lb)   LMP 05/29/2008 (Approximate)   SpO2 97%   BMI 25.46 kg/m    Body mass index is 25.46 kg/m .  Physical Exam   GENERAL: healthy, alert and no distress  NECK: no adenopathy, no asymmetry, masses, or scars and thyroid normal to palpation  RESP: lungs clear to auscultation - no rales, rhonchi or wheezes  CV: regular rate and rhythm, normal S1 S2, no S3 or S4, no murmur, click or rub, no peripheral edema and peripheral pulses strong  ABDOMEN: soft, nontender, no hepatosplenomegaly, no masses and bowel sounds normal  MS: no gross musculoskeletal defects noted, no edema    A/P:    (I10) Essential hypertension with goal blood pressure less than 140/90  (primary encounter " diagnosis)  Comment:   Plan: amLODIPine (NORVASC) 2.5 MG tablet        Not controlled. Patient discontinue lisinopril because this cause her to be dizzy. Start low dose amlodipine 2.5 mg daily. Recheck in 1 month.    Samuel Torrez MD  ]

## 2021-05-06 NOTE — PATIENT INSTRUCTIONS
At [facility], we strive to deliver an exceptional experience to you, every time we see you. If you receive a survey, please complete it as we do value your feedback.  If you have MyChart, you can expect to receive results automatically within 24 hours of their completion.  Your provider will send a note interpreting your results as well.   If you do not have MyChart, you should receive your results in about a week by mail.    Your care team:                            Family Medicine Internal Medicine   MD Tonio Leyva MD Shantel Branch-Fleming, MD Srinivasa Vaka, MD Katya Belousova, PADEUCE Harden, APRN CNP    Samuel Torrez, MD Pediatrics   Geovany Barkley, PADEUCE Talley, CNP MD Abigail Kyle APRN CNP   MD Radha Chin MD Deborah Mielke, MD Grisel Sanchez, APRN [facility]      Clinic hours: Monday - Thursday 7 am-6 pm; Fridays 7 am-5 pm.   Urgent care: Monday - Friday 10 am- 8 pm; Saturday and Sunday 9 am-5 pm.    Clinic: [phone]       [facility]: Monday - Thursday 8 am - 7 pm; Friday 8 am - 6 pm  [facility]: [phone]     Use [email or web address] for 24/7 diagnosis and treatment of dozens of conditions.

## 2021-06-03 ENCOUNTER — OFFICE VISIT (OUTPATIENT)
Dept: FAMILY MEDICINE | Facility: CLINIC | Age: 66
End: 2021-06-03
Payer: COMMERCIAL

## 2021-06-03 VITALS
SYSTOLIC BLOOD PRESSURE: 155 MMHG | DIASTOLIC BLOOD PRESSURE: 95 MMHG | HEIGHT: 59 IN | OXYGEN SATURATION: 99 % | WEIGHT: 125 LBS | BODY MASS INDEX: 25.2 KG/M2 | HEART RATE: 98 BPM

## 2021-06-03 DIAGNOSIS — I10 ESSENTIAL HYPERTENSION WITH GOAL BLOOD PRESSURE LESS THAN 140/90: Primary | ICD-10-CM

## 2021-06-03 DIAGNOSIS — E78.5 HYPERLIPIDEMIA LDL GOAL <130: ICD-10-CM

## 2021-06-03 PROCEDURE — 99213 OFFICE O/P EST LOW 20 MIN: CPT | Performed by: FAMILY MEDICINE

## 2021-06-03 ASSESSMENT — PAIN SCALES - GENERAL: PAINLEVEL: NO PAIN (0)

## 2021-06-03 ASSESSMENT — MIFFLIN-ST. JEOR: SCORE: 1023.66

## 2021-06-03 NOTE — PATIENT INSTRUCTIONS
At [facility], we strive to deliver an exceptional experience to you, every time we see you. If you receive a survey, please complete it as we do value your feedback.  If you have MyChart, you can expect to receive results automatically within 24 hours of their completion.  Your provider will send a note interpreting your results as well.   If you do not have MyChart, you should receive your results in about a week by mail.    Your care team:                            Family Medicine Internal Medicine   MD Tonio Leyva MD Shantel Branch-Fleming, MD Srinivasa Vaka, MD Katya Belousova, PADEUCE Harden, APRN CNP    Samuel Torrez, MD Pediatrics   Geovany Barkley, PADEUCE Talley, CNP MD Abigail Kyle APRN CNP   MD Radha Chin MD Deborah Mielke, MD Grisel Sanchez, APRN [employer]      Clinic hours: Monday - Thursday 7 am-6 pm; Fridays 7 am-5 pm.   Urgent care: Monday - Friday 10 am- 8 pm; Saturday and Sunday 9 am-5 pm.    Clinic: [phone]       [facility]: Monday - Thursday 8 am - 7 pm; Friday 8 am - 6 pm  [facility]: [phone]     Use www.[email or web address] for 24/7 diagnosis and treatment of dozens of conditions.

## 2021-06-03 NOTE — PROGRESS NOTES
"    Subjective   DARIAN ALANIZ is a 65 year old who presents for the following health issues:    HPI     Hypertension Follow-up      Do you check your blood pressure regularly outside of the clinic? Yes     Are you following a low salt diet? Yes    Are your blood pressures ever more than 140 on the top number (systolic) OR more   than 90 on the bottom number (diastolic), for example 140/90? Yes      How many servings of fruits and vegetables do you eat daily?  2-3    On average, how many sweetened beverages do you drink each day (Examples: soda, juice, sweet tea, etc.  Do NOT count diet or artificially sweetened beverages)?   0    How many days per week do you exercise enough to make your heart beat faster? 6    How many minutes a day do you exercise enough to make your heart beat faster? 20 - 29    How many days per week do you miss taking your medication? 0    Hyperlipidemia Follow-Up      Are you regularly taking any medication or supplement to lower your cholesterol?   No    Are you having muscle aches or other side effects that you think could be caused by your cholesterol lowering medication?  No      Review of Systems   Constitutional, HEENT, cardiovascular, pulmonary, GI, , musculoskeletal, neuro, skin, endocrine and psych systems are negative, except as otherwise noted.      Objective    BP (!) 155/95   Pulse 98   Ht 1.492 m (4' 10.75\")   Wt 56.7 kg (125 lb)   LMP 05/29/2008 (Approximate)   SpO2 99%   BMI 25.46 kg/m    Body mass index is 25.46 kg/m .  Physical Exam   GENERAL: healthy, alert and no distress  NECK: no adenopathy, no asymmetry, masses, or scars and thyroid normal to palpation  RESP: lungs clear to auscultation - no rales, rhonchi or wheezes  CV: regular rate and rhythm, normal S1 S2, no S3 or S4, no murmur, click or rub, no peripheral edema and peripheral pulses strong  ABDOMEN: soft, nontender, no hepatosplenomegaly, no masses and bowel sounds normal  MS: no gross musculoskeletal defects " noted, no edema    A/P:  (I10) Essential hypertension with goal blood pressure less than 140/90  (primary encounter diagnosis)  Comment:   Plan: not at goal in clinic. Borderline at home per patient systolic low 140's. Discussed increasing dose of amlodipine but patient did not want this. Patient worried about dizziness/lightheadedness. Patient would like to continue current medication and dose for a few months to see if this helps. Okay with this. RTC in 6 months for recheck.    (E78.5) Hyperlipidemia LDL goal <130  Comment:   Plan: not controlled. Patient would like to work on diet and exercise first to try to lower this. Recheck at next visit in 6 months.    Samuel Torrez MD

## 2021-09-26 ENCOUNTER — HEALTH MAINTENANCE LETTER (OUTPATIENT)
Age: 66
End: 2021-09-26

## 2022-02-08 ENCOUNTER — OFFICE VISIT (OUTPATIENT)
Dept: FAMILY MEDICINE | Facility: CLINIC | Age: 67
End: 2022-02-08
Payer: COMMERCIAL

## 2022-02-08 VITALS
WEIGHT: 127.6 LBS | DIASTOLIC BLOOD PRESSURE: 83 MMHG | TEMPERATURE: 98.7 F | OXYGEN SATURATION: 99 % | SYSTOLIC BLOOD PRESSURE: 137 MMHG | HEART RATE: 83 BPM | BODY MASS INDEX: 25.99 KG/M2 | RESPIRATION RATE: 15 BRPM

## 2022-02-08 DIAGNOSIS — I10 ESSENTIAL HYPERTENSION WITH GOAL BLOOD PRESSURE LESS THAN 140/90: Primary | ICD-10-CM

## 2022-02-08 DIAGNOSIS — E78.5 HYPERLIPIDEMIA LDL GOAL <130: ICD-10-CM

## 2022-02-08 LAB
ANION GAP SERPL CALCULATED.3IONS-SCNC: 7 MMOL/L (ref 3–14)
BUN SERPL-MCNC: 16 MG/DL (ref 7–30)
CALCIUM SERPL-MCNC: 9.2 MG/DL (ref 8.5–10.1)
CHLORIDE BLD-SCNC: 107 MMOL/L (ref 94–109)
CHOLEST SERPL-MCNC: 287 MG/DL
CO2 SERPL-SCNC: 27 MMOL/L (ref 20–32)
CREAT SERPL-MCNC: 0.68 MG/DL (ref 0.52–1.04)
FASTING STATUS PATIENT QL REPORTED: NO
GFR SERPL CREATININE-BSD FRML MDRD: >90 ML/MIN/1.73M2
GLUCOSE BLD-MCNC: 81 MG/DL (ref 70–99)
HDLC SERPL-MCNC: 43 MG/DL
LDLC SERPL CALC-MCNC: 167 MG/DL
NONHDLC SERPL-MCNC: 244 MG/DL
POTASSIUM BLD-SCNC: 3.7 MMOL/L (ref 3.4–5.3)
SODIUM SERPL-SCNC: 141 MMOL/L (ref 133–144)
TRIGL SERPL-MCNC: 387 MG/DL

## 2022-02-08 PROCEDURE — 99214 OFFICE O/P EST MOD 30 MIN: CPT | Performed by: FAMILY MEDICINE

## 2022-02-08 PROCEDURE — 80061 LIPID PANEL: CPT | Performed by: FAMILY MEDICINE

## 2022-02-08 PROCEDURE — 36415 COLL VENOUS BLD VENIPUNCTURE: CPT | Performed by: FAMILY MEDICINE

## 2022-02-08 PROCEDURE — 80048 BASIC METABOLIC PNL TOTAL CA: CPT | Performed by: FAMILY MEDICINE

## 2022-02-08 RX ORDER — AMLODIPINE BESYLATE 2.5 MG/1
2.5 TABLET ORAL DAILY
Qty: 90 TABLET | Refills: 3 | Status: SHIPPED | OUTPATIENT
Start: 2022-02-08 | End: 2022-10-26

## 2022-02-08 NOTE — PROGRESS NOTES
Subjective   DARIAN ALANIZ is a 66 year old who presents for the following health issues:    HPI     Hyperlipidemia Follow-Up      Are you regularly taking any medication or supplement to lower your cholesterol?   No    Are you having muscle aches or other side effects that you think could be caused by your cholesterol lowering medication?  n/a    Hypertension Follow-up      Do you check your blood pressure regularly outside of the clinic? Yes     Are you following a low salt diet? Yes    Are your blood pressures ever more than 140 on the top number (systolic) OR more   than 90 on the bottom number (diastolic), for example 140/90? No      Review of Systems   Constitutional, HEENT, cardiovascular, pulmonary, GI, , musculoskeletal, neuro, skin, endocrine and psych systems are negative, except as otherwise noted.      Objective    /83 (BP Location: Left arm, Patient Position: Sitting, Cuff Size: Adult Regular)   Pulse 83   Temp 98.7  F (37.1  C) (Tympanic)   Resp 15   Wt 57.9 kg (127 lb 9.6 oz)   LMP 05/29/2008 (Approximate)   SpO2 99%   BMI 25.99 kg/m    Body mass index is 25.99 kg/m .  Physical Exam   GENERAL: healthy, alert and no distress  NECK: no adenopathy, no asymmetry, masses, or scars and thyroid normal to palpation  RESP: lungs clear to auscultation - no rales, rhonchi or wheezes  CV: regular rate and rhythm, normal S1 S2, no S3 or S4, no murmur, click or rub, no peripheral edema and peripheral pulses strong  ABDOMEN: soft, nontender, no hepatosplenomegaly, no masses and bowel sounds normal  MS: no gross musculoskeletal defects noted, no edema    A/P:    (I10) Essential hypertension with goal blood pressure less than 140/90  (primary encounter diagnosis)  Comment:   Plan: amLODIPine (NORVASC) 2.5 MG tablet, Basic         metabolic panel  (Ca, Cl, CO2, Creat, Gluc, K,         Na, BUN)        Controlled. RTC in 6 months for recheck. Reviewed low salt diet.    (E78.5) Hyperlipidemia LDL goal  <130  Comment:   Plan: Lipid panel reflex to direct LDL Non-fasting        Recheck. Patient not interested in medication at this time. Patient working on low fat diet. Monitor.    Samuel Torrez MD

## 2022-05-08 ENCOUNTER — HEALTH MAINTENANCE LETTER (OUTPATIENT)
Age: 67
End: 2022-05-08

## 2022-07-08 ENCOUNTER — MYC MEDICAL ADVICE (OUTPATIENT)
Dept: FAMILY MEDICINE | Facility: CLINIC | Age: 67
End: 2022-07-08

## 2022-07-20 ENCOUNTER — TELEPHONE (OUTPATIENT)
Dept: FAMILY MEDICINE | Facility: CLINIC | Age: 67
End: 2022-07-20

## 2022-07-20 ENCOUNTER — MYC MEDICAL ADVICE (OUTPATIENT)
Dept: FAMILY MEDICINE | Facility: CLINIC | Age: 67
End: 2022-07-20

## 2022-07-20 NOTE — TELEPHONE ENCOUNTER
Marci calling to say that the patient was discharged on 7/7/2022 and has a form to fill out. Patient needs a hospital follow up appointment. Please advise.  Catherine Lopez [facility]  2nd Floor  Primary Care

## 2022-07-21 NOTE — TELEPHONE ENCOUNTER
Called and spoke to Marci and scheduled an appt okayed by Dr Torrez, 7/28/2022.  Catherine Lopez MA  [facility]  2nd Floor  Primary Care

## 2022-07-28 ENCOUNTER — OFFICE VISIT (OUTPATIENT)
Dept: FAMILY MEDICINE | Facility: CLINIC | Age: 67
End: 2022-07-28
Payer: COMMERCIAL

## 2022-07-28 VITALS
SYSTOLIC BLOOD PRESSURE: 118 MMHG | OXYGEN SATURATION: 96 % | DIASTOLIC BLOOD PRESSURE: 78 MMHG | TEMPERATURE: 98.6 F | WEIGHT: 120.6 LBS | BODY MASS INDEX: 24.57 KG/M2 | HEART RATE: 76 BPM

## 2022-07-28 DIAGNOSIS — Z23 ENCOUNTER FOR IMMUNIZATION: ICD-10-CM

## 2022-07-28 DIAGNOSIS — R73.09 ELEVATED HEMOGLOBIN A1C: Primary | ICD-10-CM

## 2022-07-28 DIAGNOSIS — Z90.49 S/P CHOLECYSTECTOMY: ICD-10-CM

## 2022-07-28 DIAGNOSIS — I10 ESSENTIAL HYPERTENSION WITH GOAL BLOOD PRESSURE LESS THAN 140/90: ICD-10-CM

## 2022-07-28 PROCEDURE — 99214 OFFICE O/P EST MOD 30 MIN: CPT | Performed by: FAMILY MEDICINE

## 2022-07-28 NOTE — LETTER
[address] [address]  Phone: [phone]    July 28, 2022        Janet Giordano  [address]  [place] [address]          To whom it may concern:    RE: Janet Giordano    Patient was seen and treat/ed today at our clinic and missed work.  Patient may return to work 8/3/2022 with the following:  Work maximum 4 hours per day.  When the patient returns to work, the following restrictions apply until 9/5/2022.    Please contact me for questions or concerns.      Sincerely,        Samuel Torrez MD

## 2022-07-28 NOTE — PROGRESS NOTES
Subjective   DARIAN ALANIZ is a 66 year old presenting for the following health issues:  No chief complaint on file.      [facility]       Hospital Follow-up Visit:    Hospital/Nursing Home/IP Rehab Facility: [facility]   Date of Admission: 7/3/2022  Date of Discharge: 7/6/2022  Reason(s) for Admission: Gallbladder removal, elevated a1c    Was your hospitalization related to COVID-19? No   Problems taking medications regularly:  None  Medication changes since discharge: None  Problems adhering to non-medication therapy:  None    Summary of hospitalization:  CareEverywhere information obtained and reviewed  Diagnostic Tests/Treatments reviewed.  Follow up needed: none  Other Healthcare Providers Involved in Patient s Care:         None  Update since discharge: improved. Post Medication Reconciliation Status:        Plan of care communicated with patient     Hypertension Follow-up      Do you check your blood pressure regularly outside of the clinic? Yes     Are you following a low salt diet? Yes    Are your blood pressures ever more than 140 on the top number (systolic) OR more   than 90 on the bottom number (diastolic), for example 140/90? No    Review of Systems   Constitutional, HEENT, cardiovascular, pulmonary, GI, , musculoskeletal, neuro, skin, endocrine and psych systems are negative, except as otherwise noted.      Objective    /78 (BP Location: Left arm, Patient Position: Sitting, Cuff Size: Adult Regular)   Pulse 76   Temp 98.6  F (37  C) (Oral)   Wt 54.7 kg (120 lb 9.6 oz)   LMP 05/29/2008 (Approximate)   SpO2 96%   BMI 24.57 kg/m    Body mass index is 24.57 kg/m .  Physical Exam   GENERAL: healthy, alert and no distress  NECK: no adenopathy, no asymmetry, masses, or scars and thyroid normal to palpation  RESP: lungs clear to auscultation - no rales, rhonchi or wheezes  CV: regular rate and rhythm, normal S1 S2, no S3 or S4, no murmur, click or rub, no peripheral edema and peripheral  pulses strong  ABDOMEN: soft, nontender, no hepatosplenomegaly, no masses and bowel sounds normal  MS: no gross musculoskeletal defects noted, no edema    A/P:  (R73.09) Elevated hemoglobin A1c  (primary encounter diagnosis)  Comment:   Plan: Hemoglobin A1c        Monitor at this time. Recheck in 3 months. May start metformin.    (I10) Essential hypertension with goal blood pressure less than 140/90  Comment:   Plan: controlled.    (Z90.49) S/P cholecystectomy  Comment:   Plan: doing well.    (Z23) Encounter for immunization  Comment:   Plan: patient declined COVID-19 booster today.    Samuel Torrez MD              .  ..

## 2022-08-01 ENCOUNTER — TELEPHONE (OUTPATIENT)
Dept: FAMILY MEDICINE | Facility: CLINIC | Age: 67
End: 2022-08-01

## 2022-08-01 NOTE — TELEPHONE ENCOUNTER
.Reason for Call:  Form, our goal is to have forms completed with 72 hours, however, some forms may require a visit or additional information.    Type of letter, form or note:  disability    Who is the form from?: Patient    Where did the form come from: Patient or family brought in       What clinic location was the form placed at?: [facility]    Where the form was placed: dummy    What number is listed as a contact on the form?: [phone]       Additional comments:     Call taken on 8/1/2022 at 11:21 AM by Susy Rubi MA

## 2022-08-01 NOTE — TELEPHONE ENCOUNTER
Received [employer] forms and placed in Dr Torrez's box.  Catherine Lopez MA  [facility]  2nd Floor  Primary Care

## 2022-08-03 NOTE — TELEPHONE ENCOUNTER
Received signed form. Bringing to the  by 11:30 am today, 8/3/2022. Copy to TC and abstracting.  Called and spoke to the patient and explained form . Patient understands.  Catherine Lopez [facility]  2nd Floor  Primary Care

## 2022-08-29 ENCOUNTER — TELEPHONE (OUTPATIENT)
Dept: FAMILY MEDICINE | Facility: CLINIC | Age: 67
End: 2022-08-29

## 2022-08-29 NOTE — TELEPHONE ENCOUNTER
Received the form and placed in Dr Torrez's box.  Catherine Lopez MA  [facility]  2nd Floor  Primary Care

## 2022-08-29 NOTE — TELEPHONE ENCOUNTER
.What type of form? medical  What day did you drop off your forms? 08/29/2022  Is there a due date? Patient would like form fax this week if possible (7-10 business day to compete forms)   How would you like to receive these forms? Patient will  at the clinic when completed  Which clinic [facility] the [facility] off [facility]    What is the best number to contact you? Cell [phone]  What time works best to contact you with in 4 hrs? any  Is it okay to leave a message? Yes    Dayton Pearce

## 2022-08-31 NOTE — TELEPHONE ENCOUNTER
Received signed Medical Certification form and faxed to [phone], right fax confirmed at 2:05 pm today, 8/31/2022. Bringing to the  by 5:00 pm today, 8/31/2022. Copy to TC and abstracting. Called and spoke to the family they will pick this up in a few days.  Catherine Lopez MA  [facility]  2nd Floor  Primary Care

## 2022-09-10 ENCOUNTER — OFFICE VISIT (OUTPATIENT)
Dept: URGENT CARE | Facility: URGENT CARE | Age: 67
End: 2022-09-10
Payer: COMMERCIAL

## 2022-09-10 VITALS
SYSTOLIC BLOOD PRESSURE: 152 MMHG | HEART RATE: 93 BPM | WEIGHT: 118.6 LBS | TEMPERATURE: 97.9 F | OXYGEN SATURATION: 96 % | BODY MASS INDEX: 24.16 KG/M2 | DIASTOLIC BLOOD PRESSURE: 79 MMHG

## 2022-09-10 DIAGNOSIS — R21 RASH AND NONSPECIFIC SKIN ERUPTION: Primary | ICD-10-CM

## 2022-09-10 PROCEDURE — 99213 OFFICE O/P EST LOW 20 MIN: CPT | Performed by: PHYSICIAN ASSISTANT

## 2022-09-10 RX ORDER — CETIRIZINE HYDROCHLORIDE 10 MG/1
10 TABLET ORAL DAILY
Qty: 30 TABLET | Refills: 0 | Status: SHIPPED | OUTPATIENT
Start: 2022-09-10 | End: 2023-04-12

## 2022-09-10 RX ORDER — PREDNISONE 20 MG/1
20 TABLET ORAL 2 TIMES DAILY
Qty: 14 TABLET | Refills: 0 | Status: SHIPPED | OUTPATIENT
Start: 2022-09-10 | End: 2022-09-17

## 2022-09-10 ASSESSMENT — ENCOUNTER SYMPTOMS
CARDIOVASCULAR NEGATIVE: 1
CHEST TIGHTNESS: 0
PALPITATIONS: 0
COLOR CHANGE: 1
WHEEZING: 0
CONSTITUTIONAL NEGATIVE: 1
FATIGUE: 0
RESPIRATORY NEGATIVE: 1
WOUND: 0
CHILLS: 0
COUGH: 0
FEVER: 0
SHORTNESS OF BREATH: 0

## 2022-09-10 NOTE — PROGRESS NOTES
Subjective   DARIAN ALANIZ is a 67 year old, presenting for the following health issues:  Derm Problem (Rash all over body. Onset- Thursday )    HPI   Rash  Onset/Duration: 3days  Description  Location: all over her skin  Character: blotchy, raised, red  Itching: moderate  Intensity:  moderate  Progression of Symptoms:  worsening  Accompanying signs and symptoms:   Fever: No  Body aches or joint pain: No  Sore throat symptoms: No  Recent cold symptoms: YES  History:           Previous episodes of similar rash: None  New exposures:  foods - fish balls  Recent travel: No  Exposure to similar rash: No  Precipitating or alleviating factors: none  Therapies tried and outcome: green tea with minimal relief    Patient Active Problem List   Diagnosis     Essential hypertension with goal blood pressure less than 140/90     CARDIOVASCULAR SCREENING; LDL GOAL LESS THAN 130     Chronic fatigue     Gastroesophageal reflux disease with esophagitis     Immigrant with language difficulty     Advanced directives, counseling/discussion     Helicobacter pylori infection     Gallstones     Hyperlipidemia LDL goal <130     Current Outpatient Medications   Medication     amLODIPine (NORVASC) 2.5 MG tablet     Blood Pressure KIT     Cholecalciferol (VITAMIN D3 PO)     fish oil-omega-3 fatty acids 1000 MG capsule     Multiple Vitamins-Minerals (PRESERVISION AREDS 2 PO)     vitamin B complex with vitamin C (STRESS TAB) tablet     No current facility-administered medications for this visit.        Allergies   Allergen Reactions     Contrast Dye Shortness Of Breath and Cough     After CT contrast injection she felt nauseous and lead to cough and shortness of breath. Radiologist felt this was an allergic reaction. Isovue 370       Review of Systems   Constitutional: Negative.  Negative for chills, fatigue and fever.   Respiratory: Negative.  Negative for cough, chest tightness, shortness of breath and wheezing.    Cardiovascular: Negative.   Negative for chest pain, palpitations and peripheral edema.   Skin: Positive for color change and rash. Negative for pallor and wound.   All other systems reviewed and are negative.           Objective    BP (!) 152/79 (BP Location: Left arm, Patient Position: Sitting, Cuff Size: Adult Regular)   Pulse 93   Temp 97.9  F (36.6  C) (Tympanic)   Wt 53.8 kg (118 lb 9.6 oz)   LMP 05/29/2008 (Approximate)   SpO2 96%   BMI 24.16 kg/m    Body mass index is 24.16 kg/m .  Physical Exam  Vitals and nursing note reviewed.   Constitutional:       General: She is not in acute distress.     Appearance: Normal appearance. She is well-developed and normal weight. She is not ill-appearing.   Skin:     General: Skin is warm and dry.      Findings: Rash (scattered over her chest and abdomen.  no tenderness or discharge present) present. No abrasion, abscess, bruising, ecchymosis, erythema or petechiae. Rash is macular, papular and urticarial. Rash is not crusting, nodular, purpuric, pustular, scaling or vesicular.   Neurological:      Mental Status: She is alert and oriented to person, place, and time.   Psychiatric:         Mood and Affect: Mood normal.         Behavior: Behavior normal.         Thought Content: Thought content normal.         Judgment: Judgment normal.          Assessment/Plan:  Rash and nonspecific skin eruption:  ?contact/allergic dermatitis vs hives.  Will give [id number] and recommended zyrtec for itching.  Avoid triggers and irritating agents.  Avoid scratching to present secondary infection.  RTC if worsening rash or if she develops redness, swelling, drainage or fevers.  Will send to DERM if no improvement.  -     predniSONE (DELTASONE) 20 MG tablet; Take 1 tablet (20 mg) by mouth 2 times daily for 7 days  -     cetirizine (ZYRTEC) 10 MG tablet; Take 1 tablet (10 mg) by mouth daily  -     Adult Dermatology Referral        Donna Moore PA-C

## 2022-09-19 ENCOUNTER — OFFICE VISIT (OUTPATIENT)
Dept: URGENT CARE | Facility: URGENT CARE | Age: 67
End: 2022-09-19
Payer: COMMERCIAL

## 2022-09-19 VITALS
BODY MASS INDEX: 23.99 KG/M2 | DIASTOLIC BLOOD PRESSURE: 89 MMHG | HEART RATE: 97 BPM | WEIGHT: 119 LBS | TEMPERATURE: 98.3 F | SYSTOLIC BLOOD PRESSURE: 153 MMHG | HEIGHT: 59 IN | OXYGEN SATURATION: 99 %

## 2022-09-19 DIAGNOSIS — H81.10 BENIGN PAROXYSMAL POSITIONAL VERTIGO, UNSPECIFIED LATERALITY: Primary | ICD-10-CM

## 2022-09-19 PROCEDURE — 99214 OFFICE O/P EST MOD 30 MIN: CPT | Performed by: EMERGENCY MEDICINE

## 2022-09-19 RX ORDER — MECLIZINE HYDROCHLORIDE 25 MG/1
25 TABLET ORAL 3 TIMES DAILY PRN
Qty: 2021 TABLET | Refills: 0 | Status: SHIPPED | OUTPATIENT
Start: 2022-09-19 | End: 2023-04-12

## 2022-09-19 NOTE — PROGRESS NOTES
Assessment & Plan     Diagnosis:    (H81.10) Benign paroxysmal positional vertigo, unspecified laterality  (primary encounter diagnosis)  Plan: meclizine (ANTIVERT) 25 MG tablet      Medical Decision Making:  Janet Giordano is a 67 year old female who presents for evaluation of vertigo. The differential diagnosis of vertigo is broad and includes common etiologies such as menieres disease, labyrinthitis, benign positional vertigo, otitis media, etc.  More serious etiologies considered include central etiologies such as tumor, intracerebral bleed, dissection, ischemic cerebral vascular accident.  The history, physical exam including detailed neurologic exam, and workup in the urgent care suggests a benign cause of vertigo today.  Further outpatient management is indicated with vertigo medications.       No indication for advanced imaging at this point (CT/MRI) as there are no definite signs of a central and concerning etiology for the vertigo.      Vertigo precautions given for home.    Patient voices understanding and agreement with the plan including reasons to go to the ER immediately as well as to be seen by a more consistent care-giver, such as their PCP, if the symptoms persist more than 2 days.     30 minutes spent on the date of the encounter doing chart review, review of outside records, patient visit, documentation and discussion with family       [facility]    Subjective     Janet Giordano is a 67 year old female who presents to clinic today for the following health issues:  Chief Complaint   Patient presents with     Dizziness     Pt said that she get dizzy when she laid down and wake up.      Nausea     Insomnia       HPI  Patient states for the last few days has been experiencing dizziness when she gets up from laying down, sometimes turning a certain way in bed.  She states has been going on intermittently, seems to be worse in the mornings when she first gets up.  She is also  "been having some nausea with this but has not been throwing up.  She denies any history of stroke, numbness or weakness in the arms or legs, headaches, vision changes, neck pain or stiffness, recent trauma, speech difficulties, chest pain, shortness of breath, fevers, alcohol or drug use.      Review of Systems    See HPI    Objective      Vitals: BP (!) 153/89 (BP Location: Left arm, Patient Position: Sitting, Cuff Size: Adult Regular)   Pulse 97   Temp 98.3  F (36.8  C) (Tympanic)   Ht 1.5 m (4' 11.06\")   Wt 54 kg (119 lb)   LMP 05/29/2008 (Approximate)   SpO2 99%   BMI 23.99 kg/m      Patient Vitals for the past 24 hrs:   BP Temp Temp src Pulse SpO2 Height Weight   09/19/22 1339 (!) 153/89 98.3  F (36.8  C) Tympanic 97 99 % 1.5 m (4' 11.06\") 54 kg (119 lb)       Vital signs reviewed by: Tuan Castillo PA-C    Physical Exam   Constitutional: Patient is alert and cooperative. No acute distress.  Head: Atraumatic.  No raccoon eyes or celestin signs.  Neck: Normal range of motion.  No meningismus.  Ears: Right TM is pale/grey and non-bulging with fluid noted behind TM. No perforation. Left TM is pale/grey. External ear canals are normal.  Mouth: Mucous membranes are moist. Normal tongue and tonsil. Posterior oropharynx is clear.  Eyes: Conjunctivae, EOMI and lids are normal. PERRL.  No nystagmus.  Cardiovascular: Regular rate and rhythm  Pulmonary/Chest: Lungs are clear to auscultation throughout. Effort normal. No respiratory distress. No wheezes, rales or rhonchi.  GI: Abdomen is soft and non-tender throughout.   Neurological: Alert and oriented x3. CN 2-12 intact.. Strength and sensation are intact and symmetric in the bilateral upper and lower extremities.  Speech is fluent and face is symmetric.  Gait is stable.  Negative Romberg.  Normal finger-nose-finger and heel shin test.  Skin: No rash noted on visualized skin.  Psychiatric:The patient has a normal mood and affect.       Tuan Castillo PA-C, " September 19, 2022

## 2022-09-19 NOTE — LETTER
[facility] U[facility] [address] [address]          September 19, 2022    RE:  Janet Giordano                                                                                                                                                       [id number]  [place] [address]            To whom it may concern:    Janet Giordano is under my professional care for Benign paroxysmal positional vertigo, unspecified laterality She  may return to work with the following: The employee is UNABLE to return to work until 9/26/2022.      Tuan Castillo PA-C

## 2022-09-21 ENCOUNTER — TELEPHONE (OUTPATIENT)
Dept: FAMILY MEDICINE | Facility: CLINIC | Age: 67
End: 2022-09-21

## 2022-09-21 NOTE — TELEPHONE ENCOUNTER
.What type of form? work   What day did you drop off your forms? 9/21/22    Is there a due date? 9/22/22 before her phone appt at 8am  (7-10 business day to compete forms)   How would you like to receive these forms? Please fax to #on form and also Pt will , please call [phone].    Which clinic was the form dropped off at? [facility]    What is the best number to contact you? Cell [phone]  What time works best to contact you with in 4 hrs? any  Is it okay to leave a message? Yes    Atiya Harry    Re: Work forms, absence management

## 2022-09-22 ENCOUNTER — VIRTUAL VISIT (OUTPATIENT)
Dept: FAMILY MEDICINE | Facility: CLINIC | Age: 67
End: 2022-09-22
Payer: COMMERCIAL

## 2022-09-22 DIAGNOSIS — H81.10 BENIGN PAROXYSMAL POSITIONAL VERTIGO, UNSPECIFIED LATERALITY: Primary | ICD-10-CM

## 2022-09-22 PROCEDURE — 99213 OFFICE O/P EST LOW 20 MIN: CPT | Mod: 95 | Performed by: FAMILY MEDICINE

## 2022-09-22 NOTE — PROGRESS NOTES
DARIAN ALANIZ is a 67 year old who is being evaluated via a billable telephone visit.      What phone number would you like to be contacted at? [phone]  How would you like to obtain your AVS? Etelvina, Patient requested an emailed AVS if possible.      Subjective   DARIAN ALANIZ is a 67 year old presenting for the following health issues:  No chief complaint on file.      HPI     Dizziness  Onset/Duration: four days ago. Ongoing  Description:   Do you feel faint: YES  Does it feel like the surroundings (bed, room) are moving: YES  Unsteady/off balance: YES  Have you passed out or fallen: No  Intensity: moderate, 5/10  Progression of Symptoms: worsening and waxing and waning  Accompanying Signs & Symptoms:  Heart palpitations or chest pain: No  Nausea, vomiting: YES  Weakness or lack of coordination in arms or legs: No  Vision or speech changes: YES  Numbness or tingling: No  Ringing in ears (Tinnitus): No  Hearing Loss: YES- a little  History:   Head trauma/concussion history: No  Previous similar symptoms: YES  Recent bleeding history: No  Any new medications (BP?): YES- Meclizine  Precipitating factors:   Worse with activity: No  Worse with head movement: No  Alleviating factors:   Does staying in a fixed position give relief: YES- patient states when she has relief after taking medication, however after waking up the dizziness is worse.  Therapies tried and outcome: meclizine, is not helping      Review of Systems   Constitutional, HEENT, cardiovascular, pulmonary, GI, , musculoskeletal, neuro, skin, endocrine and psych systems are negative, except as otherwise noted.      Objective           Vitals:  No vitals were obtained today due to virtual visit.    Physical Exam   healthy, alert and no distress  PSYCH: Alert and oriented times 3; coherent speech, normal   rate and volume, able to articulate logical thoughts, able   to abstract reason, no tangential thoughts, no hallucinations   or delusions  Her affect is  normal  RESP: No cough, no audible wheezing, able to talk in full sentences  Remainder of exam unable to be completed due to telephone visits    A/P:  (H81.10) Benign paroxysmal positional vertigo, unspecified laterality  (primary encounter diagnosis)  Comment:   Plan: Will have patient see physical therapy for evaluation and treatment. If no improvements, patient will let us know. Continue with meclizine as needed.        Samuel Torrez MD          Phone call duration: 9 minutes

## 2022-09-22 NOTE — TELEPHONE ENCOUNTER
Form faxed to [employer] [phone], copy placed in abstract and original placed at the  for patient . Called pt so he is aware this.

## 2022-09-22 NOTE — PATIENT INSTRUCTIONS
At [facility], we strive to deliver an exceptional experience to you, every time we see you. If you receive a survey, please complete it as we do value your feedback.  If you have MyChart, you can expect to receive results automatically within 24 hours of their completion.  Your provider will send a note interpreting your results as well.   If you do not have MyChart, you should receive your results in about a week by mail.    Your care team:                            Family Medicine Internal Medicine   MD Tonio Leyva MD Shantel Branch-Fleming, MD Srinivasa Vaka, MD Katya Belousova, YEVGENIY Lopez CNP, MD ([facility]   Geovany Barkley, MD Leann Moran MD Amelia Massimini APRN CNP Kim Thein, APRN CNP Bethany Templen, MD             Clinic hours: Monday - Thursday 7 am-6 pm; Fridays 7 am-5 pm.   Urgent care: Monday - Friday 10 am- 8 pm; Saturday and Sunday 9 am-5 pm.    Clinic: [phone]       [facility]: Monday - Thursday 8 am - 7 pm; Friday 8 am - 6 pm  [facility]: [phone]

## 2022-10-25 ENCOUNTER — HOSPITAL ENCOUNTER (OUTPATIENT)
Dept: PHYSICAL THERAPY | Facility: CLINIC | Age: 67
Setting detail: THERAPIES SERIES
Discharge: HOME | End: 2022-10-25
Attending: FAMILY MEDICINE
Payer: MEDICARE

## 2022-10-25 PROCEDURE — 95992 CANALITH REPOSITIONING PROC: CPT | Mod: GP | Performed by: PHYSICAL THERAPIST

## 2022-10-25 PROCEDURE — 97161 PT EVAL LOW COMPLEX 20 MIN: CPT | Mod: GP | Performed by: PHYSICAL THERAPIST

## 2022-10-25 NOTE — PROGRESS NOTES
10/25/22 0900   Quick Adds   Quick Adds Certification;Vestibular Eval   Type of Visit Initial OP PT Evaluation   General Information   Start of Care Date 10/25/22   Referring Physician Samuel Torrez MD   Orders Evaluate and Treat as Indicated   Order Date 09/22/22   Medical Diagnosis Vertigo; BPPV   Onset of illness/injury or Date of Surgery 09/22/22   Surgical/Medical history reviewed Yes  (HTN)   Pertinent history of current problem (include personal factors and/or comorbidities that impact the POC) A month ago when laying down she experienced room spinning sensation. She felt the same senations when waking up in the morning. Went to ER. Currently, she is not feeling the symptom of room spinning, but more dizziness and nausea. Experiences some dizziness with rolling, and sometimes she is a little nautious when waking up. She isn't taking meclizine anymore.   Prior level of function comment independent   Current Community Support Family/friend caregiver   Patient role/Employment history Employed  (assembly line)   Living environment House/townhome   Home/Community Accessibility Comments  and son   Patient/Family Goals Statement Decrease dizziness symptoms and improve balance   Fall Risk Screen   Fall screen completed by PT   Have you fallen 2 or more times in the past year? No   Have you fallen and had an injury in the past year? No   Timed Up and Go score (seconds) Not assessed today   Is patient a fall risk? Yes   System Outcome Measures   Outcome Measures BPPV   Dizziness Handicap Inventory (score out of 100) A decrease in score by 17.18 or greater indicates a clinically significant change in symptoms. 32   Pain   Patient currently in pain Yes   Pain location neck   Pain comments neck pain, onset was years ago   Vitals Signs   Heart Rate 85   SpO2 96   Blood Pressure 168/103   Vital Signs Comments reports feeling nervous before taking BP   Cognitive Status Examination   Orientation orientation to person,  place and time   Level of Consciousness alert   Follows Commands and Answers Questions 100% of the time   Personal Safety and Judgment intact   Memory intact   Observation   Observation Patient emotional during session, had teary eyes after jalen-hallpike.   Posture   Posture Protracted shoulders;Forward head position   Range of Motion (ROM)   ROM Comment cervical ROM WNL   Bed Mobility   Bed Mobility Comments independent, reports she always sits up from right sidelying every morning   Transfer Skills   Transfer Comments independent   Balance   Balance Comments Patient reports feeling off balance at times--will further assess next session as needed   Sensory Examination   Sensory Perception Comments Not assessed--test further next session as needed   Cervicogenic Screen   Neck ROM WNL   Vertebral Artery Test Comments Tolerated jalen-hallpike   Oculomotor Exam   Smooth Pursuit Normal   Saccades Normal   Infrared Goggle Exam or Frenzel Lense Exam   Vestibular Suppressant in Last 24 Hours? No   Exam completed with Infrared Goggles   Spontaneous Nystagmus Negative   Gaze Evoked Nystagmus Negative   Head Shake Horizontal Nystagmus Negative   Jalen-Hallpike (right) Other   Canaan-Hallpike (right) comments No nystagmus observed but patient felt dizzy and nauseous upon sitting   Jalen-Hallpike (Left) Negative   HSCC Supine Roll Test (Right) Negative   HSCC Supine Roll Test (Left) Negative   BPPV Canal(s) R Posterior   BPPV Type Canalithasis   Other Infrared Goggle Exam or Frenzel Lense Exam Comments When performing epley maneuver, nystagmus was observed with head to the left (horizontal right beating), patient reported mild symptoms, decreased after 15 secs.   Planned Therapy Interventions   Planned Therapy Interventions IADL retraining;balance training;neuromuscular re-education;stretching;ROM;strengthening;gait training   Clinical Impression   Criteria for Skilled Therapeutic Interventions Met yes, treatment indicated   PT Diagnosis  s/s consistent with BPPV   Influenced by the following impairments Positional dizziness, impaired dynamic balance, nausea, imbalance   Functional limitations due to impairments iADLs, community ambulation, work   Clinical Presentation Stable/Uncomplicated   Clinical Presentation Rationale Medical history, symptoms have decreased from her episode a month ago, PT judgement   Clinical Decision Making (Complexity) Low complexity   Therapy Frequency 1 time/week   Predicted Duration of Therapy Intervention (days/wks) 60 days   Risk & Benefits of therapy have been explained Yes   Patient, Family & other staff in agreement with plan of care Yes   Clinical Impression Comments Patient is experiencing dizziness and nausea that come on in the morning and with positional changes. BPPV testing was inconclusive. Patient did experience symptoms when sitting up from right ronaldo-hallpike, but nystagmus was not observed. She would benefit from skilled PT services to decrease symptoms of dizziness and improve imbalance.   Education Assessment   Preferred Learning Style Listening;Demonstration   Barriers to Learning Language  (Understood most English)   GOALS   PT Eval Goals 1;2   Goal 1   Goal Identifier Symptoms   Goal Description Patient will report no symptoms of dizziness and nausea when sitting up from supine at home for 3 mornings in a row order to demonstrate an improvement in vertigo symptoms   Target Date 12/23/22   Goal 2   Goal Identifier balance   Goal Description Patient will score a 23 or > on the FGA in order to decrease her risk of falls and allow for safe mobilization in the community   Target Date 12/23/22   Total Evaluation Time   PT Eval, Low Complexity Minutes (74277) 35   Therapy Certification   Certification date from 10/25/22   Certification date to 01/22/23   Medical Diagnosis Vertigo   Certification I certify the need for these services furnished under this plan of treatment and while under my care.  (Physician  co-signature of this document indicates review and certification of the therapy plan).

## 2022-10-25 NOTE — PROGRESS NOTES
[facility]                                                                                   OUTPATIENT PHYSICAL THERAPY FUNCTIONAL EVALUATION  PLAN OF TREATMENT FOR OUTPATIENT REHABILITATION  (COMPLETE FOR INITIAL CLAIMS ONLY)  Patient's Last Name, First Name, M.I.  YOB: 1955  Janet Giordano        Provider's Name   [facility]   Medical Record No.  [id number]     Start of Care Date:  10/25/22   Onset Date:  09/22/22   Type:     _X__PT   ____OT  ____SLP Medical Diagnosis:  Vertigo;BPPV     PT Diagnosis:  s/s consistent with BPPV Visits from SOC:  1                              __________________________________________________________________________________  Plan of Treatment/Functional Goals:  IADL retraining, balance training, neuromuscular re-education, stretching, ROM, strengthening, gait training           GOALS  Symptoms  Patient will report no symptoms of dizziness and nausea when sitting up from supine at home for 3 mornings in a row order to demonstrate an improvement in vertigo symptoms  12/23/22    balance  Patient will score a 23 or > on the FGA in order to decrease her risk of falls and allow for safe mobilization in the community  12/23/22                Therapy Frequency:  1 time/week   Predicted Duration of Therapy Intervention:  60 days    Ines De Los Santos PT                                    I CERTIFY THE NEED FOR THESE SERVICES FURNISHED UNDER        THIS PLAN OF TREATMENT AND WHILE UNDER MY CARE     (Physician co-signature of this document indicates review and certification of the therapy plan).                Certification Date From:  10/25/22   Certification Date To:  01/22/23    Referring Provider:  Samuel Torrez MD    Initial Assessment  See Epic Evaluation- Start of Care Date: 10/25/22

## 2022-10-26 ENCOUNTER — OFFICE VISIT (OUTPATIENT)
Dept: FAMILY MEDICINE | Facility: CLINIC | Age: 67
End: 2022-10-26
Payer: COMMERCIAL

## 2022-10-26 VITALS
DIASTOLIC BLOOD PRESSURE: 80 MMHG | TEMPERATURE: 98 F | WEIGHT: 121.8 LBS | HEART RATE: 89 BPM | BODY MASS INDEX: 24.56 KG/M2 | SYSTOLIC BLOOD PRESSURE: 135 MMHG | HEIGHT: 59 IN | OXYGEN SATURATION: 97 %

## 2022-10-26 DIAGNOSIS — Z23 ENCOUNTER FOR IMMUNIZATION: ICD-10-CM

## 2022-10-26 DIAGNOSIS — I10 ESSENTIAL HYPERTENSION WITH GOAL BLOOD PRESSURE LESS THAN 140/90: Primary | ICD-10-CM

## 2022-10-26 DIAGNOSIS — E78.5 HYPERLIPIDEMIA LDL GOAL <130: ICD-10-CM

## 2022-10-26 DIAGNOSIS — R73.09 ELEVATED HEMOGLOBIN A1C: ICD-10-CM

## 2022-10-26 LAB — HBA1C MFR BLD: 6.5 % (ref 0–5.6)

## 2022-10-26 PROCEDURE — 99214 OFFICE O/P EST MOD 30 MIN: CPT | Mod: 25 | Performed by: FAMILY MEDICINE

## 2022-10-26 PROCEDURE — 36415 COLL VENOUS BLD VENIPUNCTURE: CPT | Performed by: FAMILY MEDICINE

## 2022-10-26 PROCEDURE — 83721 ASSAY OF BLOOD LIPOPROTEIN: CPT | Mod: 59 | Performed by: FAMILY MEDICINE

## 2022-10-26 PROCEDURE — 90677 PCV20 VACCINE IM: CPT | Performed by: FAMILY MEDICINE

## 2022-10-26 PROCEDURE — 90471 IMMUNIZATION ADMIN: CPT | Performed by: FAMILY MEDICINE

## 2022-10-26 PROCEDURE — 83036 HEMOGLOBIN GLYCOSYLATED A1C: CPT | Performed by: FAMILY MEDICINE

## 2022-10-26 PROCEDURE — 80061 LIPID PANEL: CPT | Performed by: FAMILY MEDICINE

## 2022-10-26 RX ORDER — AMLODIPINE BESYLATE 2.5 MG/1
2.5 TABLET ORAL DAILY
Qty: 90 TABLET | Refills: 3 | Status: SHIPPED | OUTPATIENT
Start: 2022-10-26 | End: 2023-04-12

## 2022-10-26 ASSESSMENT — PAIN SCALES - GENERAL: PAINLEVEL: NO PAIN (0)

## 2022-10-26 NOTE — PROGRESS NOTES
"  Subjective   DARIAN ALANIZ is a 67 year old presenting for the following health issues:  Hospital F/U      Hyperlipidemia Follow-Up      Are you regularly taking any medication or supplement to lower your cholesterol? n/a    Are you having muscle aches or other side effects that you think could be caused by your cholesterol lowering medication?  No    Hypertension Follow-up      Do you check your blood pressure regularly outside of the clinic? Yes     Are you following a low salt diet? Yes    Are your blood pressures ever more than 140 on the top number (systolic) OR more   than 90 on the bottom number (diastolic), for example 140/90? No      Review of Systems   Constitutional, HEENT, cardiovascular, pulmonary, GI, , musculoskeletal, neuro, skin, endocrine and psych systems are negative, except as otherwise noted.      Objective    /80 (BP Location: Left arm, Patient Position: Sitting, Cuff Size: Adult Regular)   Pulse 89   Temp 98  F (36.7  C) (Oral)   Ht 1.5 m (4' 11.06\")   Wt 55.2 kg (121 lb 12.8 oz)   LMP 05/29/2008 (Approximate)   SpO2 97%   BMI 24.55 kg/m    Body mass index is 24.55 kg/m .  Physical Exam   GENERAL: healthy, alert and no distress  NECK: no adenopathy, no asymmetry, masses, or scars and thyroid normal to palpation  RESP: lungs clear to auscultation - no rales, rhonchi or wheezes  CV: regular rate and rhythm, normal S1 S2, no S3 or S4, no murmur, click or rub, no peripheral edema and peripheral pulses strong  ABDOMEN: soft, nontender, no hepatosplenomegaly, no masses and bowel sounds normal  MS: no gross musculoskeletal defects noted, no edema    A/P:  (I10) Essential hypertension with goal blood pressure less than 140/90  (primary encounter diagnosis)  Comment:   Plan: amLODIPine (NORVASC) 2.5 MG tablet        Controlled. RTC in 6 months for recheck with physical.    (E78.5) Hyperlipidemia LDL goal <130  Comment:   Plan: Lipid panel reflex to direct LDL Non-fasting        Recheck. " Discussed if still elevated should start a statin. Patient agrees with plan.    (R73.09) Elevated hemoglobin A1c  Comment:   Plan: Hemoglobin A1c        Monitor.    (Z23) Encounter for immunization  Comment:   Plan: Pneumococcal 20 Valent Conjugate (PCV20)            Samuel Torrez MD

## 2022-10-26 NOTE — NURSING NOTE
Prior to immunization administration, verified patients identity using patient s name and date of birth. Please see Immunization Activity for additional information.     Screening Questionnaire for Adult Immunization    Are you sick today?   No   Do you have allergies to medications, food, a vaccine component or latex?   No   Have you ever had a serious reaction after receiving a vaccination?   No   Do you have a long-term health problem with heart, lung, kidney, or metabolic disease (e.g., diabetes), asthma, a blood disorder, no spleen, complement component deficiency, a cochlear implant, or a spinal fluid leak?  Are you on long-term aspirin therapy?   No   Do you have cancer, leukemia, HIV/AIDS, or any other immune system problem?   No   Do you have a parent, brother, or sister with an immune system problem?   No   In the past 3 months, have you taken medications that affect  your immune system, such as prednisone, other steroids, or anticancer drugs; drugs for the treatment of rheumatoid arthritis, Crohn s disease, or psoriasis; or have you had radiation treatments?   No   Have you had a seizure, or a brain or other nervous system problem?   No   During the past year, have you received a transfusion of blood or blood    products, or been given immune (gamma) globulin or antiviral drug?   No   For women: Are you pregnant or is there a chance you could become       pregnant during the next month?   No   Have you received any vaccinations in the past 4 weeks?   No     Immunization questionnaire answers were all negative.        Per orders of Dr. Torrez, injection of PCV20 given by Donna Bender RN. Patient instructed to remain in clinic for 15 minutes afterwards, and to report any adverse reaction to me immediately.       Screening performed by Donna Bender RN on 10/26/2022 at 4:59 PM.

## 2022-10-27 LAB
CHOLEST SERPL-MCNC: 246 MG/DL
FASTING STATUS PATIENT QL REPORTED: NO
HDLC SERPL-MCNC: 35 MG/DL
LDLC SERPL CALC-MCNC: 145 MG/DL
LDLC SERPL CALC-MCNC: ABNORMAL MG/DL
NONHDLC SERPL-MCNC: 211 MG/DL
TRIGL SERPL-MCNC: 417 MG/DL

## 2022-10-27 RX ORDER — ATORVASTATIN CALCIUM 20 MG/1
20 TABLET, FILM COATED ORAL DAILY
Qty: 90 TABLET | Refills: 3 | Status: SHIPPED | OUTPATIENT
Start: 2022-10-27 | End: 2023-04-12

## 2022-11-01 ENCOUNTER — HOSPITAL ENCOUNTER (OUTPATIENT)
Dept: PHYSICAL THERAPY | Facility: CLINIC | Age: 67
Setting detail: THERAPIES SERIES
Discharge: HOME | End: 2022-11-01
Attending: FAMILY MEDICINE
Payer: MEDICARE

## 2022-11-01 PROCEDURE — 95992 CANALITH REPOSITIONING PROC: CPT | Mod: GP | Performed by: PHYSICAL THERAPIST

## 2022-11-01 PROCEDURE — 97112 NEUROMUSCULAR REEDUCATION: CPT | Mod: GP,59 | Performed by: PHYSICAL THERAPIST

## 2023-04-12 ENCOUNTER — ANCILLARY PROCEDURE (OUTPATIENT)
Dept: GENERAL RADIOLOGY | Facility: CLINIC | Age: 68
End: 2023-04-12
Attending: FAMILY MEDICINE
Payer: COMMERCIAL

## 2023-04-12 ENCOUNTER — OFFICE VISIT (OUTPATIENT)
Dept: FAMILY MEDICINE | Facility: CLINIC | Age: 68
End: 2023-04-12
Payer: COMMERCIAL

## 2023-04-12 ENCOUNTER — TELEPHONE (OUTPATIENT)
Dept: FAMILY MEDICINE | Facility: CLINIC | Age: 68
End: 2023-04-12

## 2023-04-12 VITALS
SYSTOLIC BLOOD PRESSURE: 150 MMHG | OXYGEN SATURATION: 97 % | HEIGHT: 59 IN | RESPIRATION RATE: 16 BRPM | WEIGHT: 121.6 LBS | BODY MASS INDEX: 24.52 KG/M2 | TEMPERATURE: 97.7 F | HEART RATE: 98 BPM | DIASTOLIC BLOOD PRESSURE: 80 MMHG

## 2023-04-12 DIAGNOSIS — R73.09 ELEVATED HEMOGLOBIN A1C: ICD-10-CM

## 2023-04-12 DIAGNOSIS — Z78.0 ASYMPTOMATIC POSTMENOPAUSAL ESTROGEN DEFICIENCY: ICD-10-CM

## 2023-04-12 DIAGNOSIS — Z12.31 ENCOUNTER FOR SCREENING MAMMOGRAM FOR BREAST CANCER: ICD-10-CM

## 2023-04-12 DIAGNOSIS — M25.561 RIGHT KNEE PAIN, UNSPECIFIED CHRONICITY: Primary | ICD-10-CM

## 2023-04-12 DIAGNOSIS — E78.5 HYPERLIPIDEMIA LDL GOAL <130: ICD-10-CM

## 2023-04-12 DIAGNOSIS — M25.561 RIGHT KNEE PAIN, UNSPECIFIED CHRONICITY: ICD-10-CM

## 2023-04-12 DIAGNOSIS — I10 ESSENTIAL HYPERTENSION WITH GOAL BLOOD PRESSURE LESS THAN 140/90: ICD-10-CM

## 2023-04-12 PROCEDURE — 99214 OFFICE O/P EST MOD 30 MIN: CPT | Performed by: FAMILY MEDICINE

## 2023-04-12 PROCEDURE — 73562 X-RAY EXAM OF KNEE 3: CPT | Mod: TC | Performed by: RADIOLOGY

## 2023-04-12 RX ORDER — AMLODIPINE BESYLATE 2.5 MG/1
2.5 TABLET ORAL DAILY
Qty: 90 TABLET | Refills: 3 | Status: SHIPPED | OUTPATIENT
Start: 2023-04-12 | End: 2023-07-20

## 2023-04-12 RX ORDER — DICLOFENAC SODIUM 75 MG/1
75 TABLET, DELAYED RELEASE ORAL 2 TIMES DAILY
Qty: 60 TABLET | Refills: 3 | Status: SHIPPED | OUTPATIENT
Start: 2023-04-12 | End: 2023-07-20

## 2023-04-12 ASSESSMENT — PAIN SCALES - GENERAL: PAINLEVEL: NO PAIN (0)

## 2023-04-12 NOTE — PROGRESS NOTES
Subjective   DARIAN ALANIZ is a 67 year old, presenting for the following health issues:  Knee Pain (Right knee)        4/12/2023     1:53 PM   Additional Questions   Roomed by srini   Accompanied by      History of Present Illness       Reason for visit:  Right knee pain    She eats 2-3 servings of fruits and vegetables daily.She consumes 0 sweetened beverage(s) daily.She exercises with enough effort to increase her heart rate 10 to 19 minutes per day.  She exercises with enough effort to increase her heart rate 7 days per week.   She is taking medications regularly.        Pain History:  When did you first notice your pain? 10 years ago but it just started hurting  More again  Have you seen anyone else for your pain? No- did see someone in [place] but no one here yet  How has your pain affected your ability to work? Not applicable  Where in your body do you have pain? Musculoskeletal problem/pain  Onset/Duration: November 2022- comes and goes.   Description  Location: knee - right  Joint Swelling: YES  Redness: No  Pain: YES  Warmth: No  Intensity:  2-3/10  Progression of Symptoms:  Worsening from sitting to standing  Accompanying signs and symptoms:   Fevers: No  Numbness/tingling/weakness: No  History  Trauma to the area: YES- she fell in [place] but the pain was before that and not related to the fall  Recent illness:  No  Previous similar problem: YES  Previous evaluation:  YES- in [place]  Precipitating or alleviating factors:  Aggravating factors include: standing, climbing stairs and overuse  Therapies tried and outcome: rest/inactivity- elevate knee      Hyperlipidemia Follow-Up      Are you regularly taking any medication or supplement to lower your cholesterol?   No    Are you having muscle aches or other side effects that you think could be caused by your cholesterol lowering medication?  n/a    Hypertension Follow-up      Do you check your blood pressure regularly outside of the clinic? Yes     Are  "you following a low salt diet? Yes    Are your blood pressures ever more than 140 on the top number (systolic) OR more   than 90 on the bottom number (diastolic), for example 140/90? No        Review of Systems   Constitutional, HEENT, cardiovascular, pulmonary, GI, , musculoskeletal, neuro, skin, endocrine and psych systems are negative, except as otherwise noted.      Objective    BP (!) 150/80 (BP Location: Left arm, Patient Position: Sitting, Cuff Size: Adult Regular)   Pulse 98   Temp 97.7  F (36.5  C) (Tympanic)   Resp 16   Ht 1.486 m (4' 10.5\")   Wt 55.2 kg (121 lb 9.6 oz)   LMP 05/29/2008 (Approximate)   SpO2 97%   BMI 24.98 kg/m    Body mass index is 24.98 kg/m .  Physical Exam   GENERAL: healthy, alert and no distress  NECK: no adenopathy, no asymmetry, masses, or scars and thyroid normal to palpation  RESP: lungs clear to auscultation - no rales, rhonchi or wheezes  CV: regular rate and rhythm, normal S1 S2, no S3 or S4, no murmur, click or rub, no peripheral edema and peripheral pulses strong  ABDOMEN: soft, nontender, no hepatosplenomegaly, no masses and bowel sounds normal  MS: right knee medial joint line tenderness with Nishant's    A/P:  (M25.561) Right knee pain, unspecified chronicity  (primary encounter diagnosis)  Comment:   Plan: XR Knee Right 3 Views        Osteoarthritis? Check xray. May continue with acetaminophen as needed for pain.    (E78.5) Hyperlipidemia LDL goal <130  Comment:   Plan: Lipid panel reflex to direct LDL Fasting        Patient not taking atorvastatin. Patient would like recheck before starting.    (R73.09) Elevated hemoglobin A1c  Comment:   Plan: Hemoglobin A1c        Monitor.    (I10) Essential hypertension with goal blood pressure less than 140/90  Comment:   Plan: amLODIPine (NORVASC) 2.5 MG tablet        Controlled per patient at home. Monitor. Recheck at next Wellness visit in 3-6 months.    (Z78.0) Asymptomatic postmenopausal estrogen deficiency  Comment: "   Plan: DEXA HIP/PELVIS/SPINE - Future            (Z12.31) Encounter for screening mammogram for breast cancer  Comment:   Plan: MA SCREENING DIGITAL BILAT - Future  (s+30)            Samuel Torrez MD

## 2023-04-12 NOTE — TELEPHONE ENCOUNTER
"This writer attempted to contact patient on 04/12/23      Reason for call results and left message via [place] .      If patient calls back:   Relay message below, (read verbatim), document that pt called and close encounter    Asuncion Gaytan RN    ----- Message -----  From: Samuel Torrez MD  Sent: 4/12/2023   4:48 PM CDT  To: [facility]    Patient needs a [place] . Please notify patient that her xrays shows \"enthesitis\" which is some inflammation around tendon where it attaches to bone. Her is near the upper part of the knee cap. This can cause her pain. I sent an anti-inflammatory, diclofenac, that she can take twice daily for about 2 weeks to help with pain. This was sent to her pharmacy. Patient has a scheduled visit in July and we can recheck the knee at that time.    Samuel Torrez MD        "

## 2023-04-13 NOTE — TELEPHONE ENCOUNTER
This writer attempted to contact patient via [place]  on 04/13/23      Reason for call relay message from provider below and left message.      If patient calls back:   Please relay message from provider below.        Chana Germain RN

## 2023-04-14 NOTE — TELEPHONE ENCOUNTER
This writer attempted to contact patient on 04/14/23      Reason for call x-ray results and plan and left message.      If patient calls back:   Registered Nurse called. Please relay below provider message as written. Thanks!        Grisel Solorzano RN

## 2023-04-17 NOTE — TELEPHONE ENCOUNTER
This writer attempted to contact patient via [place]  on 04/17/23      Reason for call Xray results and provider recommendations and left message.      If patient calls back:   Registered Nurse called. Relay provider message below.       4th attempt. MyChart message sent to patient with provider message.       Donna Bender RN

## 2023-04-23 ENCOUNTER — HEALTH MAINTENANCE LETTER (OUTPATIENT)
Age: 68
End: 2023-04-23

## 2023-04-25 ENCOUNTER — ANCILLARY PROCEDURE (OUTPATIENT)
Dept: MAMMOGRAPHY | Facility: CLINIC | Age: 68
End: 2023-04-25
Payer: COMMERCIAL

## 2023-04-25 ENCOUNTER — LAB (OUTPATIENT)
Dept: LAB | Facility: CLINIC | Age: 68
End: 2023-04-25
Payer: COMMERCIAL

## 2023-04-25 DIAGNOSIS — Z12.31 ENCOUNTER FOR SCREENING MAMMOGRAM FOR BREAST CANCER: ICD-10-CM

## 2023-04-25 DIAGNOSIS — E78.5 HYPERLIPIDEMIA LDL GOAL <130: ICD-10-CM

## 2023-04-25 DIAGNOSIS — R73.09 ELEVATED HEMOGLOBIN A1C: ICD-10-CM

## 2023-04-25 DIAGNOSIS — E78.5 HYPERLIPIDEMIA LDL GOAL <100: ICD-10-CM

## 2023-04-25 DIAGNOSIS — E11.9 TYPE 2 DIABETES MELLITUS WITHOUT COMPLICATION, WITHOUT LONG-TERM CURRENT USE OF INSULIN (H): Primary | ICD-10-CM

## 2023-04-25 LAB
CHOLEST SERPL-MCNC: 252 MG/DL
FASTING STATUS PATIENT QL REPORTED: YES
HBA1C MFR BLD: 6.9 % (ref 0–5.6)
HDLC SERPL-MCNC: 50 MG/DL
LDLC SERPL CALC-MCNC: 134 MG/DL
NONHDLC SERPL-MCNC: 202 MG/DL
TRIGL SERPL-MCNC: 342 MG/DL

## 2023-04-25 PROCEDURE — 77067 SCR MAMMO BI INCL CAD: CPT | Mod: TC | Performed by: RADIOLOGY

## 2023-04-25 PROCEDURE — 83036 HEMOGLOBIN GLYCOSYLATED A1C: CPT

## 2023-04-25 PROCEDURE — 80061 LIPID PANEL: CPT

## 2023-04-25 PROCEDURE — 36415 COLL VENOUS BLD VENIPUNCTURE: CPT

## 2023-04-25 RX ORDER — METFORMIN HCL 500 MG
500 TABLET, EXTENDED RELEASE 24 HR ORAL
Qty: 90 TABLET | Refills: 1 | Status: SHIPPED | OUTPATIENT
Start: 2023-04-25 | End: 2023-07-20

## 2023-04-25 RX ORDER — ATORVASTATIN CALCIUM 20 MG/1
20 TABLET, FILM COATED ORAL DAILY
Qty: 90 TABLET | Refills: 3 | Status: SHIPPED | OUTPATIENT
Start: 2023-04-25 | End: 2023-07-20

## 2023-07-20 ENCOUNTER — OFFICE VISIT (OUTPATIENT)
Dept: FAMILY MEDICINE | Facility: CLINIC | Age: 68
End: 2023-07-20
Payer: COMMERCIAL

## 2023-07-20 VITALS
DIASTOLIC BLOOD PRESSURE: 81 MMHG | OXYGEN SATURATION: 97 % | TEMPERATURE: 97.7 F | WEIGHT: 120.2 LBS | RESPIRATION RATE: 20 BRPM | HEART RATE: 95 BPM | BODY MASS INDEX: 25.23 KG/M2 | HEIGHT: 58 IN | SYSTOLIC BLOOD PRESSURE: 139 MMHG

## 2023-07-20 DIAGNOSIS — I10 ESSENTIAL HYPERTENSION WITH GOAL BLOOD PRESSURE LESS THAN 140/90: ICD-10-CM

## 2023-07-20 DIAGNOSIS — E78.5 HYPERLIPIDEMIA LDL GOAL <100: ICD-10-CM

## 2023-07-20 DIAGNOSIS — E11.9 TYPE 2 DIABETES MELLITUS WITHOUT COMPLICATION, WITHOUT LONG-TERM CURRENT USE OF INSULIN (H): ICD-10-CM

## 2023-07-20 DIAGNOSIS — Z00.00 ENCOUNTER FOR MEDICARE ANNUAL WELLNESS EXAM: Primary | ICD-10-CM

## 2023-07-20 LAB — HBA1C MFR BLD: 7 % (ref 0–5.6)

## 2023-07-20 PROCEDURE — 99214 OFFICE O/P EST MOD 30 MIN: CPT | Mod: 25 | Performed by: FAMILY MEDICINE

## 2023-07-20 PROCEDURE — G0402 INITIAL PREVENTIVE EXAM: HCPCS | Performed by: FAMILY MEDICINE

## 2023-07-20 PROCEDURE — 82570 ASSAY OF URINE CREATININE: CPT | Performed by: FAMILY MEDICINE

## 2023-07-20 PROCEDURE — 82043 UR ALBUMIN QUANTITATIVE: CPT | Performed by: FAMILY MEDICINE

## 2023-07-20 PROCEDURE — 80061 LIPID PANEL: CPT | Performed by: FAMILY MEDICINE

## 2023-07-20 PROCEDURE — 80048 BASIC METABOLIC PNL TOTAL CA: CPT | Performed by: FAMILY MEDICINE

## 2023-07-20 PROCEDURE — 36415 COLL VENOUS BLD VENIPUNCTURE: CPT | Performed by: FAMILY MEDICINE

## 2023-07-20 PROCEDURE — 83036 HEMOGLOBIN GLYCOSYLATED A1C: CPT | Performed by: FAMILY MEDICINE

## 2023-07-20 RX ORDER — AMLODIPINE BESYLATE 2.5 MG/1
2.5 TABLET ORAL DAILY
Qty: 90 TABLET | Refills: 3 | Status: SHIPPED | OUTPATIENT
Start: 2023-07-20 | End: 2023-12-20

## 2023-07-20 RX ORDER — ATORVASTATIN CALCIUM 20 MG/1
20 TABLET, FILM COATED ORAL DAILY
Qty: 90 TABLET | Refills: 3 | Status: SHIPPED | OUTPATIENT
Start: 2023-07-20 | End: 2023-12-20

## 2023-07-20 RX ORDER — METFORMIN HCL 500 MG
500 TABLET, EXTENDED RELEASE 24 HR ORAL
Qty: 90 TABLET | Refills: 1 | Status: SHIPPED | OUTPATIENT
Start: 2023-07-20 | End: 2023-12-20

## 2023-07-20 ASSESSMENT — ENCOUNTER SYMPTOMS
CHILLS: 0
NAUSEA: 0
NERVOUS/ANXIOUS: 0
ARTHRALGIAS: 1
EYE PAIN: 0
BREAST MASS: 0
MYALGIAS: 0
WEAKNESS: 0
FEVER: 0
DYSURIA: 0
DIARRHEA: 0
ABDOMINAL PAIN: 0
JOINT SWELLING: 0
FREQUENCY: 0
SORE THROAT: 0
CONSTIPATION: 0
HEMATURIA: 0
HEADACHES: 0
DIZZINESS: 0
SHORTNESS OF BREATH: 0
PARESTHESIAS: 1
COUGH: 0
HEARTBURN: 0
HEMATOCHEZIA: 0
PALPITATIONS: 1

## 2023-07-20 ASSESSMENT — PAIN SCALES - GENERAL: PAINLEVEL: NO PAIN (0)

## 2023-07-20 ASSESSMENT — ACTIVITIES OF DAILY LIVING (ADL): CURRENT_FUNCTION: NO ASSISTANCE NEEDED

## 2023-07-20 NOTE — PATIENT INSTRUCTIONS
Patient Education   Personalized Prevention Plan  You are due for the preventive services outlined below.  Your care team is available to assist you in scheduling these services.  If you have already completed any of these items, please share that information with your care team to update in your medical record.  Health Maintenance Due   Topic Date Due     Osteoporosis Screening  Never done     Diabetic Foot Exam  Never done     Eye Exam  Never done     Kidney Microalbumin Urine Test  04/03/2022     Annual Wellness Visit  08/16/2022     Basic Metabolic Panel  02/08/2023     A1C Lab  07/25/2023     ANNUAL REVIEW OF HM ORDERS  07/28/2023

## 2023-07-20 NOTE — PROGRESS NOTES
"SUBJECTIVE:   DARIAN ALANIZ is a 67 year old who presents for Preventive Visit.      7/20/2023     1:13 PM   Additional Questions   Roomed by Alea Martinez     Are you in the first 12 months of your Medicare coverage?  Yes,  Visual Acuity:  Right Eye: 20   Left Eye: 20/25 Both Eyes: 20/30    Healthy Habits:     In general, how would you rate your overall health?  Excellent    Frequency of exercise:  2-3 days/week    Duration of exercise:  15-30 minutes    Do you usually eat at least 4 servings of fruit and vegetables a day, include whole grains    & fiber and avoid regularly eating high fat or \"junk\" foods?  Yes    Taking medications regularly:  Yes    Medication side effects:  None    Ability to successfully perform activities of daily living:  No assistance needed    Home Safety:  No safety concerns identified    Hearing Impairment:  Difficulty understanding soft or whispered speech    In the past 6 months, have you been bothered by leaking of urine?  No    In general, how would you rate your overall mental or emotional health?  Good    Additional concerns today:  Yes        Have you ever done Advance Care Planning? (For example, a Health Directive, POLST, or a discussion with a medical provider or your loved ones about your wishes): No, advance care planning information given to patient to review.  Patient declined advance care planning discussion at this time.      Fall risk  Fallen 2 or more times in the past year?: No  Any fall with injury in the past year?: No  click delete button to remove this line now  Cognitive Screening   1) Repeat 3 items (Leader, Season, Table)    2) Clock draw: ABNORMAL Got it wrong  3) 3 item recall: Recalls 3 objects  Results: ABNORMAL clock, 1-2 items recalled: PROBABLE COGNITIVE IMPAIRMENT, **INFORM PROVIDER**    Mini-CogTM Copyright MENDEZ Villalba. Licensed by the author for use in [facility]; reprinted with permission ([email or web address]@.[facility]). All rights reserved.      Do you have sleep " apnea, excessive snoring or daytime drowsiness?: no    Reviewed and updated as needed this visit by clinical staff     Meds              Reviewed and updated as needed this visit by Provider                 Social History     Tobacco Use     Smoking status: Never     Smokeless tobacco: Never   Substance Use Topics     Alcohol use: No     Alcohol/week: 0.0 standard drinks of alcohol             7/20/2023     1:22 PM   Alcohol Use   Prescreen: >3 drinks/day or >7 drinks/week? Not Applicable          No data to display              Do you have a current opioid prescription? No  Do you use any other controlled substances or medications that are not prescribed by a provider? None      Current providers sharing in care for this patient include:   Patient Care Team:  No Ref-Primary, Physician as PCP - General  Samuel Torrez MD as Assigned PCP    The following health maintenance items are reviewed in Epic and correct as of today:  Health Maintenance   Topic Date Due     DEXA  Never done     DIABETIC FOOT EXAM  Never done     EYE EXAM  Never done     MICROALBUMIN  04/03/2022     MEDICARE ANNUAL WELLNESS VISIT  08/16/2022     BMP  02/08/2023     A1C  07/25/2023     ANNUAL REVIEW OF HM ORDERS  07/28/2023     INFLUENZA VACCINE (1) 09/01/2023     LIPID  04/25/2024     FALL RISK ASSESSMENT  07/20/2024     MAMMO SCREENING  04/25/2025     ADVANCE CARE PLANNING  03/31/2026     COLORECTAL CANCER SCREENING  05/03/2026     DTAP/TDAP/TD IMMUNIZATION (4 - Td or Tdap) 08/06/2026     HEPATITIS C SCREENING  Completed     HIV SCREENING  Completed     PHQ-2 (once per calendar year)  Completed     Pneumococcal Vaccine: 65+ Years  Completed     ZOSTER IMMUNIZATION  Completed     COVID-19 Vaccine  Completed     IPV IMMUNIZATION  Aged Out     MENINGITIS IMMUNIZATION  Aged Out     PAP  Discontinued     Lab work is in process  Labs reviewed in EPIC  BP Readings from Last 3 Encounters:   07/20/23 139/81   04/12/23 (!) 150/80   10/26/22 135/80    Wt  Readings from Last 3 Encounters:   07/20/23 54.5 kg (120 lb 3.2 oz)   04/12/23 55.2 kg (121 lb 9.6 oz)   10/26/22 55.2 kg (121 lb 12.8 oz)                  Patient Active Problem List   Diagnosis     Essential hypertension with goal blood pressure less than 140/90     CARDIOVASCULAR SCREENING; LDL GOAL LESS THAN 130     Chronic fatigue     Gastroesophageal reflux disease with esophagitis     Immigrant with language difficulty     Advanced directives, counseling/discussion     Helicobacter pylori infection     Gallstones     Hyperlipidemia LDL goal <130     Type 2 diabetes mellitus without complication, without long-term current use of insulin (H)     Past Surgical History:   Procedure Laterality Date     ESOPHAGOSCOPY, GASTROSCOPY, DUODENOSCOPY (EGD), COMBINED N/A 10/23/2015    Procedure: COMBINED ESOPHAGOSCOPY, GASTROSCOPY, DUODENOSCOPY (EGD);  Surgeon: Duane, William Charles, MD;  Location:  OR     ESOPHAGOSCOPY, GASTROSCOPY, DUODENOSCOPY (EGD), COMBINED N/A 10/23/2015    Procedure: COMBINED ESOPHAGOSCOPY, GASTROSCOPY, DUODENOSCOPY (EGD), BIOPSY SINGLE OR MULTIPLE;  Surgeon: Duane, William Charles, MD;  Location:  OR       Social History     Tobacco Use     Smoking status: Never     Smokeless tobacco: Never   Substance Use Topics     Alcohol use: No     Alcohol/week: 0.0 standard drinks of alcohol     No family history on file.      Current Outpatient Medications   Medication Sig Dispense Refill     amLODIPine (NORVASC) 2.5 MG tablet Take 1 tablet (2.5 mg) by mouth daily For blood pressure. 90 tablet 3     atorvastatin (LIPITOR) 20 MG tablet Take 1 tablet (20 mg) by mouth daily For cholesterol. 90 tablet 3     fish oil-omega-3 fatty acids 1000 MG capsule Take 2 g by mouth daily       metFORMIN (GLUCOPHAGE XR) 500 MG 24 hr tablet Take 1 tablet (500 mg) by mouth daily (with dinner) For diabetes. 90 tablet 1     Multiple Vitamins-Minerals (PRESERVISION AREDS 2 PO)        vitamin B complex with vitamin C (STRESS  TAB) tablet Take 1 tablet by mouth daily       Allergies   Allergen Reactions     Contrast Dye Shortness Of Breath and Cough     After CT contrast injection she felt nauseous and lead to cough and shortness of breath. Radiologist felt this was an allergic reaction. Isovue 370     Recent Labs   Lab Test 04/25/23  0808 10/26/22  1710 02/08/22  1501 04/03/21  0940 06/14/19  1555 06/09/18  0907 11/03/15  1442 10/20/15  1109   A1C 6.9* 6.5*  --   --   --   --   --   --    * 145* 167* 172* 138*   < >  --   --    HDL 50 35* 43* 47* 52   < >  --   --    TRIG 342* 417* 387* 355* 125   < >  --   --    ALT  --   --   --  40 59*  --  44 38   CR  --   --  0.68 0.64 0.63   < > 0.65 0.53   GFRESTIMATED  --   --  >90 >90 >90   < > >90  Non  GFR Calc   >90  Non  GFR Calc     GFRESTBLACK  --   --   --  >90 >90   < > >90   GFR Calc   >90   GFR Calc     POTASSIUM  --   --  3.7 3.9 3.6   < > 3.8 3.5   TSH  --   --   --   --  1.45  --   --  0.64    < > = values in this interval not displayed.              3/24/2021     8:53 AM   Breast CA Risk Assessment (FHS-7)   Do you have a family history of breast, colon, or ovarian cancer? No / Unknown       Pertinent mammograms are reviewed under the imaging tab.    Review of Systems   Constitutional: Negative for chills and fever.   HENT: Negative for congestion, ear pain, hearing loss and sore throat.    Eyes: Negative for pain and visual disturbance.   Respiratory: Negative for cough and shortness of breath.    Cardiovascular: Positive for palpitations. Negative for chest pain and peripheral edema.   Gastrointestinal: Negative for abdominal pain, constipation, diarrhea, heartburn, hematochezia and nausea.   Breasts:  Negative for tenderness, breast mass and discharge.   Genitourinary: Negative for dysuria, frequency, genital sores, hematuria, pelvic pain, urgency, vaginal bleeding and vaginal discharge.   Musculoskeletal:  "Positive for arthralgias. Negative for joint swelling and myalgias.   Skin: Negative for rash.   Neurological: Positive for paresthesias. Negative for dizziness, weakness and headaches.   Psychiatric/Behavioral: Negative for mood changes. The patient is not nervous/anxious.      Constitutional, HEENT, cardiovascular, pulmonary, GI, , musculoskeletal, neuro, skin, endocrine and psych systems are negative, except as otherwise noted.    OBJECTIVE:   /81 (BP Location: Left arm, Patient Position: Sitting, Cuff Size: Adult Regular)   Pulse 95   Temp 97.7  F (36.5  C) (Tympanic)   Resp 20   Ht 1.48 m (4' 10.27\")   Wt 54.5 kg (120 lb 3.2 oz)   LMP 05/29/2008 (Approximate)   SpO2 97%   BMI 24.89 kg/m   Estimated body mass index is 24.89 kg/m  as calculated from the following:    Height as of this encounter: 1.48 m (4' 10.27\").    Weight as of this encounter: 54.5 kg (120 lb 3.2 oz).  Physical Exam  GENERAL: healthy, alert and no distress  NECK: no adenopathy, no asymmetry, masses, or scars and thyroid normal to palpation  RESP: lungs clear to auscultation - no rales, rhonchi or wheezes  CV: regular rate and rhythm, normal S1 S2, no S3 or S4, no murmur, click or rub, no peripheral edema and peripheral pulses strong  ABDOMEN: soft, nontender, no hepatosplenomegaly, no masses and bowel sounds normal  MS: no gross musculoskeletal defects noted, no edema  Diabetic foot exam: normal DP and PT pulses, no trophic changes or ulcerative lesions and normal sensory exam    Diagnostic Test Results:  Labs reviewed in Epic    ASSESSMENT / PLAN:   (Z00.00) Encounter for Medicare annual wellness exam  (primary encounter diagnosis)  Comment:   Plan: as below.    (E11.9) Type 2 diabetes mellitus without complication, without long-term current use of insulin (H)  Comment:   Plan: Adult Eye  Referral, Albumin Random         Urine Quantitative with Creat Ratio, BASIC         METABOLIC PANEL, HEMOGLOBIN A1C, metFORMIN      "    (GLUCOPHAGE XR) 500 MG 24 hr tablet        Patient not taking metformin. Patient did not know. Message sent to [employer] but patient did not receive. Start metformin. Recheck in 3-6 months.    (I10) Essential hypertension with goal blood pressure less than 140/90  Comment:   Plan: Albumin Random Urine Quantitative with Creat         Ratio, BASIC METABOLIC PANEL, amLODIPine         (NORVASC) 2.5 MG tablet        Controlled.      (E78.5) Hyperlipidemia LDL goal <100  Comment:   Plan: atorvastatin (LIPITOR) 20 MG tablet        Start atorvastatin. Recheck at next visit.      Patient has been advised of split billing requirements and indicates understanding: Yes      COUNSELING:  Reviewed preventive health counseling, as reflected in patient instructions       Regular exercise       Healthy diet/nutrition       Vision screening        She reports that she has never smoked. She has never used smokeless tobacco.      Appropriate preventive services were discussed with this patient, including applicable screening as appropriate for cardiovascular disease, diabetes, osteopenia/osteoporosis, and glaucoma.  As appropriate for age/gender, discussed screening for colorectal cancer, prostate cancer, breast cancer, and cervical cancer. Checklist reviewing preventive services available has been given to the patient.    Reviewed patients plan of care and provided an AVS. The Basic Care Plan (routine screening as documented in Health Maintenance) for Janet meets the Care Plan requirement. This Care Plan has been established and reviewed with the Patient.      Samuel Torrez MD, MD  [facility]    Identified Health Risks:    I have reviewed Opioid Use Disorder and Substance Use Disorder risk factors and made any needed referrals.

## 2023-07-21 LAB
ANION GAP SERPL CALCULATED.3IONS-SCNC: 13 MMOL/L (ref 7–15)
BUN SERPL-MCNC: 10.6 MG/DL (ref 8–23)
CALCIUM SERPL-MCNC: 9.4 MG/DL (ref 8.8–10.2)
CHLORIDE SERPL-SCNC: 104 MMOL/L (ref 98–107)
CHOLEST SERPL-MCNC: 258 MG/DL
CREAT SERPL-MCNC: 0.72 MG/DL (ref 0.51–0.95)
CREAT UR-MCNC: 31.8 MG/DL
DEPRECATED HCO3 PLAS-SCNC: 23 MMOL/L (ref 22–29)
GFR SERPL CREATININE-BSD FRML MDRD: >90 ML/MIN/1.73M2
GLUCOSE SERPL-MCNC: 183 MG/DL (ref 70–99)
HDLC SERPL-MCNC: 47 MG/DL
LDLC SERPL CALC-MCNC: 158 MG/DL
MICROALBUMIN UR-MCNC: <12 MG/L
MICROALBUMIN/CREAT UR: NORMAL MG/G{CREAT}
NONHDLC SERPL-MCNC: 211 MG/DL
POTASSIUM SERPL-SCNC: 3.9 MMOL/L (ref 3.4–5.3)
SODIUM SERPL-SCNC: 140 MMOL/L (ref 136–145)
TRIGL SERPL-MCNC: 264 MG/DL

## 2023-11-15 ENCOUNTER — TRANSFERRED RECORDS (OUTPATIENT)
Dept: MULTI SPECIALTY CLINIC | Facility: CLINIC | Age: 68
End: 2023-11-15

## 2023-11-15 LAB — RETINOPATHY: NORMAL

## 2023-12-03 ENCOUNTER — HEALTH MAINTENANCE LETTER (OUTPATIENT)
Age: 68
End: 2023-12-03

## 2023-12-20 ENCOUNTER — OFFICE VISIT (OUTPATIENT)
Dept: FAMILY MEDICINE | Facility: CLINIC | Age: 68
End: 2023-12-20
Payer: COMMERCIAL

## 2023-12-20 VITALS
OXYGEN SATURATION: 98 % | HEART RATE: 82 BPM | SYSTOLIC BLOOD PRESSURE: 138 MMHG | DIASTOLIC BLOOD PRESSURE: 79 MMHG | WEIGHT: 120 LBS | TEMPERATURE: 97.4 F | RESPIRATION RATE: 18 BRPM | HEIGHT: 58 IN | BODY MASS INDEX: 25.19 KG/M2

## 2023-12-20 DIAGNOSIS — I10 ESSENTIAL HYPERTENSION WITH GOAL BLOOD PRESSURE LESS THAN 140/90: ICD-10-CM

## 2023-12-20 DIAGNOSIS — E78.5 HYPERLIPIDEMIA LDL GOAL <100: ICD-10-CM

## 2023-12-20 DIAGNOSIS — E11.9 TYPE 2 DIABETES MELLITUS WITHOUT COMPLICATION, WITHOUT LONG-TERM CURRENT USE OF INSULIN (H): Primary | ICD-10-CM

## 2023-12-20 LAB
CHOLEST SERPL-MCNC: 268 MG/DL
FASTING STATUS PATIENT QL REPORTED: NO
HBA1C MFR BLD: 6.4 % (ref 0–5.6)
HDLC SERPL-MCNC: 42 MG/DL
LDLC SERPL CALC-MCNC: 176 MG/DL
NONHDLC SERPL-MCNC: 226 MG/DL
TRIGL SERPL-MCNC: 252 MG/DL

## 2023-12-20 PROCEDURE — 36415 COLL VENOUS BLD VENIPUNCTURE: CPT | Performed by: FAMILY MEDICINE

## 2023-12-20 PROCEDURE — 83036 HEMOGLOBIN GLYCOSYLATED A1C: CPT | Performed by: FAMILY MEDICINE

## 2023-12-20 PROCEDURE — 80061 LIPID PANEL: CPT | Performed by: FAMILY MEDICINE

## 2023-12-20 PROCEDURE — 99214 OFFICE O/P EST MOD 30 MIN: CPT | Performed by: FAMILY MEDICINE

## 2023-12-20 RX ORDER — RESPIRATORY SYNCYTIAL VIRUS VACCINE 120MCG/0.5
0.5 KIT INTRAMUSCULAR ONCE
Qty: 1 EACH | Refills: 0 | Status: CANCELLED | OUTPATIENT
Start: 2023-12-20 | End: 2023-12-20

## 2023-12-20 RX ORDER — ROSUVASTATIN CALCIUM 5 MG/1
5 TABLET, COATED ORAL DAILY
Qty: 90 TABLET | Refills: 3 | Status: SHIPPED | OUTPATIENT
Start: 2023-12-20 | End: 2024-03-20

## 2023-12-20 RX ORDER — AMLODIPINE BESYLATE 2.5 MG/1
2.5 TABLET ORAL DAILY
Qty: 90 TABLET | Refills: 3 | Status: SHIPPED | OUTPATIENT
Start: 2023-12-20 | End: 2024-03-20

## 2023-12-20 ASSESSMENT — PAIN SCALES - GENERAL: PAINLEVEL: NO PAIN (0)

## 2023-12-20 NOTE — LETTER
December 21, 2023      DARIANPAULA ALANIZ Vo  [address]  [place] [address]        Dear ,    We are writing to inform you of your test results.    Your diabetes test has improved and it is at goal. Your cholesterol increased and is very high, not at goal. Please take the cholesterol medication daily to lower the cholesterol to safe range. Please follow up in 3 months for recheck.     Resulted Orders   HEMOGLOBIN A1C   Result Value Ref Range    Hemoglobin A1C 6.4 (H) 0.0 - 5.6 %      Comment:      Normal <5.7%   Prediabetes 5.7-6.4%    Diabetes 6.5% or higher     Note: Adopted from ADA consensus guidelines.   Lipid panel reflex to direct LDL Non-fasting   Result Value Ref Range    Cholesterol 268 (H) <200 mg/dL    Triglycerides 252 (H) <150 mg/dL    Direct Measure HDL 42 (L) >=50 mg/dL    LDL Cholesterol Calculated 176 (H) <=100 mg/dL    Non HDL Cholesterol 226 (H) <130 mg/dL    Patient Fasting > 8hrs? No     Narrative    Cholesterol  Desirable:  <200 mg/dL    Triglycerides  Normal:  Less than 150 mg/dL  Borderline High:  150-199 mg/dL  High:  200-499 mg/dL  Very High:  Greater than or equal to 500 mg/dL    Direct Measure HDL  Female:  Greater than or equal to 50 mg/dL   Male:  Greater than or equal to 40 mg/dL    LDL Cholesterol  Desirable:  <100mg/dL  Above Desirable:  100-129 mg/dL   Borderline High:  130-159 mg/dL   High:  160-189 mg/dL   Very High:  >= 190 mg/dL    Non HDL Cholesterol  Desirable:  130 mg/dL  Above Desirable:  130-159 mg/dL  Borderline High:  160-189 mg/dL  High:  190-219 mg/dL  Very High:  Greater than or equal to 220 mg/dL       If you have any questions or concerns, please call the clinic at the number listed above.       Sincerely,      Samuel Torrez MD

## 2023-12-20 NOTE — PROGRESS NOTES
Subjective   DARIAN ALANIZ is a 68 year old, presenting for the following health issues:  Diabetes and Hypertension        12/20/2023     9:55 AM   Additional Questions   Roomed by John   Accompanied by Spouse       History of Present Illness       Reason for visit:  Check up    She eats 2-3 servings of fruits and vegetables daily.She consumes 0 sweetened beverage(s) daily.She exercises with enough effort to increase her heart rate 10 to 19 minutes per day.  She exercises with enough effort to increase her heart rate 4 days per week.   She is taking medications regularly.       Diabetes Follow-up    How often are you checking your blood sugar? One time daily  What time of day are you checking your blood sugars (select all that apply)?  After meals  Have you had any blood sugars above 200?  No  Have you had any blood sugars below 70?  No  What symptoms do you notice when your blood sugar is low?  None  What concerns do you have today about your diabetes? None   Do you have any of these symptoms? (Select all that apply)  No numbness or tingling in feet.  No redness, sores or blisters on feet.  No complaints of excessive thirst.  No reports of blurry vision.  No significant changes to weight.  Have you had a diabetic eye exam in the last 12 months? Yes-  Location: [facility] 11/23        BP Readings from Last 2 Encounters:   12/20/23 138/79   07/20/23 139/81     Hemoglobin A1C (%)   Date Value   07/20/2023 7.0 (H)   04/25/2023 6.9 (H)     LDL Cholesterol Calculated (mg/dL)   Date Value   07/20/2023 158 (H)   04/25/2023 134 (H)   04/03/2021 172 (H)   06/14/2019 138 (H)     LDL Cholesterol Direct (mg/dL)   Date Value   10/26/2022 145 (H)         Hypertension Follow-up    Do you check your blood pressure regularly outside of the clinic? Yes   Are you following a low salt diet? Yes  Are your blood pressures ever more than 140 on the top number (systolic) OR more   than 90 on the bottom number (diastolic), for example  "140/90? No    Hyperlipidemia Follow-Up    Are you regularly taking any medication or supplement to lower your cholesterol?   No patient stated atorvastatin caused \"stomach aches.\"  Are you having muscle aches or other side effects that you think could be caused by your cholesterol lowering medication?  Yes- stomach aches      Review of Systems   Constitutional, HEENT, cardiovascular, pulmonary, GI, , musculoskeletal, neuro, skin, endocrine and psych systems are negative, except as otherwise noted.      Objective    /79 (BP Location: Left arm, Patient Position: Chair, Cuff Size: Adult Regular)   Pulse 82   Temp 97.4  F (36.3  C) (Tympanic)   Resp 18   Ht 1.48 m (4' 10.27\")   Wt 54.4 kg (120 lb)   LMP 05/29/2008 (Approximate)   SpO2 98%   BMI 24.85 kg/m    Body mass index is 24.85 kg/m .  Physical Exam   GENERAL: healthy, alert and no distress  NECK: no adenopathy, no asymmetry, masses, or scars and thyroid normal to palpation  RESP: lungs clear to auscultation - no rales, rhonchi or wheezes  CV: regular rate and rhythm, normal S1 S2, no S3 or S4, no murmur, click or rub, no peripheral edema and peripheral pulses strong  ABDOMEN: soft, nontender, no hepatosplenomegaly, no masses and bowel sounds normal  MS: no gross musculoskeletal defects noted, no edema    A/P:  (E11.9) Type 2 diabetes mellitus without complication, without long-term current use of insulin (H)  (primary encounter diagnosis)  Comment:   Plan: HEMOGLOBIN A1C        Patient does not want to take metformin. Monitor. Recheck in 3 months.    (I10) Essential hypertension with goal blood pressure less than 140/90  Comment:   Plan: amLODIPine (NORVASC) 2.5 MG tablet, Blood         Pressure Monitor KIT        Controlled.    (E78.5) Hyperlipidemia LDL goal <100  Comment:   Plan: Lipid panel reflex to direct LDL Non-fasting,         rosuvastatin (CRESTOR) 5 MG tablet        Not controlled. Start rosuvastatin low dose daily. Recheck in 3 " months.    Samuel Torrez MD

## 2023-12-20 NOTE — PATIENT INSTRUCTIONS
At [facility], we strive to deliver an exceptional experience to you, every time we see you. If you receive a survey, please complete it as we do value your feedback.  If you have MyChart, you can expect to receive results automatically within 24 hours of their completion.  Your provider will send a note interpreting your results as well.   If you do not have MyChart, you should receive your results in about a week by mail.    Your care team:                            Family Medicine Internal Medicine   MD Tonio Leyva MD Shantel Branch-Fleming, MD Srinivasa Vaka, MD Katya Belousova, PADEUCE Torrez, MD Pediatrics   Geovany Barkley, PAMD Leann Millard MD Amelia Massimini APRN CNP   YEVGENIY Del Real CNP, MD Charanya Pasupathi, MD Kathleen Widmer, NP coming October 2023 Same-Day (No follow up visit)    BRICE Zamarripa PA coming Oct 2023     Clinic hours: Monday - Thursday 7 am-6 pm; Fridays 7 am-5 pm.   Urgent care: Monday - Friday 10 am- 8 pm; Saturday and Sunday 9 am-5 pm.    Clinic: [phone]       [facility]: Monday - Thursday 8 am - 7 pm; Friday 8 am - 6 pm  [facility]: [phone]

## 2024-03-20 ENCOUNTER — OFFICE VISIT (OUTPATIENT)
Dept: FAMILY MEDICINE | Facility: CLINIC | Age: 69
End: 2024-03-20
Payer: COMMERCIAL

## 2024-03-20 VITALS
OXYGEN SATURATION: 98 % | BODY MASS INDEX: 25.4 KG/M2 | SYSTOLIC BLOOD PRESSURE: 135 MMHG | DIASTOLIC BLOOD PRESSURE: 82 MMHG | WEIGHT: 121 LBS | HEART RATE: 76 BPM | RESPIRATION RATE: 18 BRPM | HEIGHT: 58 IN

## 2024-03-20 DIAGNOSIS — E11.9 TYPE 2 DIABETES MELLITUS WITHOUT COMPLICATION, WITHOUT LONG-TERM CURRENT USE OF INSULIN (H): Primary | ICD-10-CM

## 2024-03-20 DIAGNOSIS — E78.5 HYPERLIPIDEMIA LDL GOAL <100: ICD-10-CM

## 2024-03-20 DIAGNOSIS — I10 ESSENTIAL HYPERTENSION WITH GOAL BLOOD PRESSURE LESS THAN 140/90: ICD-10-CM

## 2024-03-20 LAB
ANION GAP SERPL CALCULATED.3IONS-SCNC: 11 MMOL/L (ref 7–15)
BUN SERPL-MCNC: 12.4 MG/DL (ref 8–23)
CALCIUM SERPL-MCNC: 9.4 MG/DL (ref 8.8–10.2)
CHLORIDE SERPL-SCNC: 107 MMOL/L (ref 98–107)
CHOLEST SERPL-MCNC: 164 MG/DL
CREAT SERPL-MCNC: 0.63 MG/DL (ref 0.51–0.95)
DEPRECATED HCO3 PLAS-SCNC: 24 MMOL/L (ref 22–29)
EGFRCR SERPLBLD CKD-EPI 2021: >90 ML/MIN/1.73M2
FASTING STATUS PATIENT QL REPORTED: YES
GLUCOSE SERPL-MCNC: 117 MG/DL (ref 70–99)
HBA1C MFR BLD: 6.9 % (ref 0–5.6)
HDLC SERPL-MCNC: 49 MG/DL
LDLC SERPL CALC-MCNC: 65 MG/DL
NONHDLC SERPL-MCNC: 115 MG/DL
POTASSIUM SERPL-SCNC: 4.4 MMOL/L (ref 3.4–5.3)
SODIUM SERPL-SCNC: 142 MMOL/L (ref 135–145)
TRIGL SERPL-MCNC: 249 MG/DL

## 2024-03-20 PROCEDURE — 83036 HEMOGLOBIN GLYCOSYLATED A1C: CPT | Performed by: FAMILY MEDICINE

## 2024-03-20 PROCEDURE — 99214 OFFICE O/P EST MOD 30 MIN: CPT | Performed by: FAMILY MEDICINE

## 2024-03-20 PROCEDURE — 80048 BASIC METABOLIC PNL TOTAL CA: CPT | Performed by: FAMILY MEDICINE

## 2024-03-20 PROCEDURE — 80061 LIPID PANEL: CPT | Performed by: FAMILY MEDICINE

## 2024-03-20 PROCEDURE — 36415 COLL VENOUS BLD VENIPUNCTURE: CPT | Performed by: FAMILY MEDICINE

## 2024-03-20 RX ORDER — AMLODIPINE BESYLATE 2.5 MG/1
2.5 TABLET ORAL DAILY
Qty: 90 TABLET | Refills: 3 | Status: SHIPPED | OUTPATIENT
Start: 2024-03-20 | End: 2024-09-24

## 2024-03-20 RX ORDER — RESPIRATORY SYNCYTIAL VIRUS VACCINE 120MCG/0.5
0.5 KIT INTRAMUSCULAR ONCE
Qty: 1 EACH | Refills: 0 | Status: CANCELLED | OUTPATIENT
Start: 2024-03-20 | End: 2024-03-20

## 2024-03-20 RX ORDER — ROSUVASTATIN CALCIUM 5 MG/1
5 TABLET, COATED ORAL DAILY
Qty: 90 TABLET | Refills: 3 | Status: SHIPPED | OUTPATIENT
Start: 2024-03-20 | End: 2024-09-24

## 2024-03-20 ASSESSMENT — PAIN SCALES - GENERAL: PAINLEVEL: NO PAIN (0)

## 2024-03-20 NOTE — PROGRESS NOTES
"    Taisha ALANIZ is a 68 year old, presenting for the following health issues:  Follow Up (3  month follow up)      3/20/2024     8:31 AM   Additional Questions   Roomed by DM   Accompanied by Spouse          Diabetes Follow-up    How often are you checking your blood sugar? Not at all  What concerns do you have today about your diabetes? None   Do you have any of these symptoms? (Select all that apply)  No numbness or tingling in feet.  No redness, sores or blisters on feet.  No complaints of excessive thirst.  No reports of blurry vision.  No significant changes to weight.      BP Readings from Last 2 Encounters:   03/20/24 135/82   12/20/23 138/79     Hemoglobin A1C (%)   Date Value   12/20/2023 6.4 (H)   07/20/2023 7.0 (H)     LDL Cholesterol Calculated (mg/dL)   Date Value   12/20/2023 176 (H)   07/20/2023 158 (H)   04/03/2021 172 (H)   06/14/2019 138 (H)     LDL Cholesterol Direct (mg/dL)   Date Value   10/26/2022 145 (H)             Hyperlipidemia Follow-Up    Are you regularly taking any medication or supplement to lower your cholesterol?   Yes- rosuvastatin  Are you having muscle aches or other side effects that you think could be caused by your cholesterol lowering medication?  No    Hypertension Follow-up    Do you check your blood pressure regularly outside of the clinic? Yes   Are you following a low salt diet? Yes  Are your blood pressures ever more than 140 on the top number (systolic) OR more   than 90 on the bottom number (diastolic), for example 140/90? No        Review of Systems  Constitutional, HEENT, cardiovascular, pulmonary, GI, , musculoskeletal, neuro, skin, endocrine and psych systems are negative, except as otherwise noted.      Objective    /82   Pulse 76   Resp 18   Ht 1.473 m (4' 10\")   Wt 54.9 kg (121 lb)   LMP 05/29/2008 (Approximate)   SpO2 98%   BMI 25.29 kg/m    Body mass index is 25.29 kg/m .  Physical Exam   GENERAL: alert and no distress  NECK: no " adenopathy, no asymmetry, masses, or scars  RESP: lungs clear to auscultation - no rales, rhonchi or wheezes  CV: regular rate and rhythm, normal S1 S2, no S3 or S4, no murmur, click or rub, no peripheral edema  ABDOMEN: soft, nontender, no hepatosplenomegaly, no masses and bowel sounds normal  MS: no gross musculoskeletal defects noted, no edema    A/P:    (E11.9) Type 2 diabetes mellitus without complication, without long-term current use of insulin (H)  (primary encounter diagnosis)  Comment:   Plan: HEMOGLOBIN A1C, Basic metabolic panel  (Ca, Cl,        CO2, Creat, Gluc, K, Na, BUN)        Recheck and adjust if needed. Diet controlled currently. Recheck in 6 months.    (I10) Essential hypertension with goal blood pressure less than 140/90  Comment:   Plan: Basic metabolic panel  (Ca, Cl, CO2, Creat,         Gluc, K, Na, BUN), amLODIPine (NORVASC) 2.5 MG         tablet        Controlled. Continue with amlodipine. Patient had dizziness with lisinopril. Would like to try an ARB in the future for renal protection.    (E78.5) Hyperlipidemia LDL goal <100  Comment:   Plan: Lipid panel reflex to direct LDL Fasting,         rosuvastatin (CRESTOR) 5 MG tablet        Recheck and adjust if needed. Patient tolerating rosuvastatin.          Signed Electronically by: Samuel Torrez MD, MD    Answers submitted by the patient for this visit:  General Questionnaire (Submitted on 3/20/2024)  Chief Complaint: Chronic problems general questions HPI Form  What is the reason for your visit today? : Mole removal  How many servings of fruits and vegetables do you eat daily?: 4 or more  On average, how many sweetened beverages do you drink each day (Examples: soda, juice, sweet tea, etc.  Do NOT count diet or artificially sweetened beverages)?: 0  How many minutes a day do you exercise enough to make your heart beat faster?: 20 to 29  How many days a week do you exercise enough to make your heart beat faster?: 3 or less  How many days per  week do you miss taking your medication?: 0

## 2024-03-28 DIAGNOSIS — E11.9 TYPE 2 DIABETES MELLITUS WITHOUT COMPLICATION, WITHOUT LONG-TERM CURRENT USE OF INSULIN (H): ICD-10-CM

## 2024-03-28 RX ORDER — METFORMIN HCL 500 MG
500 TABLET, EXTENDED RELEASE 24 HR ORAL
Qty: 90 TABLET | Refills: 1 | Status: SHIPPED | OUTPATIENT
Start: 2024-03-28 | End: 2024-09-24

## 2024-06-20 ENCOUNTER — PATIENT OUTREACH (OUTPATIENT)
Dept: CARE COORDINATION | Facility: CLINIC | Age: 69
End: 2024-06-20
Payer: COMMERCIAL

## 2024-06-30 ENCOUNTER — HEALTH MAINTENANCE LETTER (OUTPATIENT)
Age: 69
End: 2024-06-30

## 2024-07-04 ENCOUNTER — PATIENT OUTREACH (OUTPATIENT)
Dept: CARE COORDINATION | Facility: CLINIC | Age: 69
End: 2024-07-04
Payer: COMMERCIAL

## 2024-08-02 ENCOUNTER — PATIENT OUTREACH (OUTPATIENT)
Dept: CARE COORDINATION | Facility: CLINIC | Age: 69
End: 2024-08-02
Payer: COMMERCIAL

## 2024-09-08 ENCOUNTER — HEALTH MAINTENANCE LETTER (OUTPATIENT)
Age: 69
End: 2024-09-08

## 2024-09-24 ENCOUNTER — OFFICE VISIT (OUTPATIENT)
Dept: FAMILY MEDICINE | Facility: CLINIC | Age: 69
End: 2024-09-24
Payer: COMMERCIAL

## 2024-09-24 VITALS
WEIGHT: 122 LBS | SYSTOLIC BLOOD PRESSURE: 134 MMHG | BODY MASS INDEX: 25.61 KG/M2 | RESPIRATION RATE: 18 BRPM | DIASTOLIC BLOOD PRESSURE: 82 MMHG | OXYGEN SATURATION: 98 % | HEART RATE: 74 BPM | HEIGHT: 58 IN | TEMPERATURE: 97.4 F

## 2024-09-24 DIAGNOSIS — Z78.0 ASYMPTOMATIC POSTMENOPAUSAL STATUS: ICD-10-CM

## 2024-09-24 DIAGNOSIS — E11.9 TYPE 2 DIABETES MELLITUS WITHOUT COMPLICATION, WITHOUT LONG-TERM CURRENT USE OF INSULIN (H): ICD-10-CM

## 2024-09-24 DIAGNOSIS — I10 ESSENTIAL HYPERTENSION WITH GOAL BLOOD PRESSURE LESS THAN 140/90: ICD-10-CM

## 2024-09-24 DIAGNOSIS — Z23 ENCOUNTER FOR IMMUNIZATION: ICD-10-CM

## 2024-09-24 DIAGNOSIS — E78.5 HYPERLIPIDEMIA LDL GOAL <100: ICD-10-CM

## 2024-09-24 DIAGNOSIS — Z00.00 ENCOUNTER FOR MEDICARE ANNUAL WELLNESS EXAM: Primary | ICD-10-CM

## 2024-09-24 LAB
ALBUMIN SERPL BCG-MCNC: 4.4 G/DL (ref 3.5–5.2)
ALP SERPL-CCNC: 117 U/L (ref 40–150)
ALT SERPL W P-5'-P-CCNC: 37 U/L (ref 0–50)
ANION GAP SERPL CALCULATED.3IONS-SCNC: 14 MMOL/L (ref 7–15)
AST SERPL W P-5'-P-CCNC: 28 U/L (ref 0–45)
BILIRUB SERPL-MCNC: 0.3 MG/DL
BUN SERPL-MCNC: 10.7 MG/DL (ref 8–23)
CALCIUM SERPL-MCNC: 9.4 MG/DL (ref 8.8–10.4)
CHLORIDE SERPL-SCNC: 106 MMOL/L (ref 98–107)
CHOLEST SERPL-MCNC: 184 MG/DL
CREAT SERPL-MCNC: 0.57 MG/DL (ref 0.51–0.95)
CREAT UR-MCNC: 32.7 MG/DL
EGFRCR SERPLBLD CKD-EPI 2021: >90 ML/MIN/1.73M2
EST. AVERAGE GLUCOSE BLD GHB EST-MCNC: 154 MG/DL
FASTING STATUS PATIENT QL REPORTED: YES
FASTING STATUS PATIENT QL REPORTED: YES
GLUCOSE SERPL-MCNC: 132 MG/DL (ref 70–99)
HBA1C MFR BLD: 7 % (ref 0–5.6)
HCO3 SERPL-SCNC: 22 MMOL/L (ref 22–29)
HDLC SERPL-MCNC: 62 MG/DL
LDLC SERPL CALC-MCNC: 79 MG/DL
MICROALBUMIN UR-MCNC: <12 MG/L
MICROALBUMIN/CREAT UR: NORMAL MG/G{CREAT}
NONHDLC SERPL-MCNC: 122 MG/DL
POTASSIUM SERPL-SCNC: 3.8 MMOL/L (ref 3.4–5.3)
PROT SERPL-MCNC: 7.4 G/DL (ref 6.4–8.3)
SODIUM SERPL-SCNC: 142 MMOL/L (ref 135–145)
TRIGL SERPL-MCNC: 214 MG/DL

## 2024-09-24 PROCEDURE — 82043 UR ALBUMIN QUANTITATIVE: CPT | Performed by: FAMILY MEDICINE

## 2024-09-24 PROCEDURE — 36415 COLL VENOUS BLD VENIPUNCTURE: CPT | Performed by: FAMILY MEDICINE

## 2024-09-24 PROCEDURE — 82570 ASSAY OF URINE CREATININE: CPT | Performed by: FAMILY MEDICINE

## 2024-09-24 PROCEDURE — G0438 PPPS, INITIAL VISIT: HCPCS | Performed by: FAMILY MEDICINE

## 2024-09-24 PROCEDURE — 91320 SARSCV2 VAC 30MCG TRS-SUC IM: CPT | Performed by: FAMILY MEDICINE

## 2024-09-24 PROCEDURE — 80061 LIPID PANEL: CPT | Performed by: FAMILY MEDICINE

## 2024-09-24 PROCEDURE — 90480 ADMN SARSCOV2 VAC 1/ONLY CMP: CPT | Performed by: FAMILY MEDICINE

## 2024-09-24 PROCEDURE — 83036 HEMOGLOBIN GLYCOSYLATED A1C: CPT | Performed by: FAMILY MEDICINE

## 2024-09-24 PROCEDURE — 80053 COMPREHEN METABOLIC PANEL: CPT | Performed by: FAMILY MEDICINE

## 2024-09-24 PROCEDURE — 90662 IIV NO PRSV INCREASED AG IM: CPT | Performed by: FAMILY MEDICINE

## 2024-09-24 PROCEDURE — G0008 ADMIN INFLUENZA VIRUS VAC: HCPCS | Performed by: FAMILY MEDICINE

## 2024-09-24 PROCEDURE — 99214 OFFICE O/P EST MOD 30 MIN: CPT | Mod: 25 | Performed by: FAMILY MEDICINE

## 2024-09-24 RX ORDER — ROSUVASTATIN CALCIUM 5 MG/1
5 TABLET, COATED ORAL DAILY
Qty: 90 TABLET | Refills: 3 | Status: SHIPPED | OUTPATIENT
Start: 2024-09-24

## 2024-09-24 RX ORDER — METFORMIN HCL 500 MG
500 TABLET, EXTENDED RELEASE 24 HR ORAL
Qty: 90 TABLET | Refills: 1 | Status: CANCELLED | OUTPATIENT
Start: 2024-09-24

## 2024-09-24 RX ORDER — AMLODIPINE BESYLATE 2.5 MG/1
2.5 TABLET ORAL DAILY
Qty: 90 TABLET | Refills: 3 | Status: SHIPPED | OUTPATIENT
Start: 2024-09-24

## 2024-09-24 ASSESSMENT — PAIN SCALES - GENERAL: PAINLEVEL: NO PAIN (0)

## 2024-09-24 NOTE — PATIENT INSTRUCTIONS
At [facility], we strive to deliver an exceptional experience to you, every time we see you. If you receive a survey, please let us know what we are doing well and/or what we could improve upon, as we do value your feedback.  If you have MyChart, you can expect to receive results automatically within 24 hours of their completion.  Your provider will send a note interpreting your results as well.   If you do not have MyChart, you should receive your results in about a week by mail.    Your care team:                            Family Medicine Internal Medicine   MD Tonio Leyva, MD Joanne Ring, MD Nicolás Curry, MD Elham Malloy, PADelphineC    Samuel Torrez, MD Pediatrics   Kamille Narayanan, MD Leann Bartlett, MD Grisel Sanchez, APRN CNP Abigail Sandhu APRN CNP   Ramy Sky, MD Radha Chan, MD Elvia Peña, CNP     Balta Kinney, CNP Same-Day Provider (No follow-up visits)   YEVGENIY Vargas, DNP Suzan Roach, YEVGENIY Barbour, FNP, BC BELIA HarperC     Clinic hours: Monday - Thursday 7 am-6 pm; Fridays 7 am-5 pm.   Urgent care: Monday - Friday 10 am- 8 pm; Saturday and Sunday 9 am-5 pm.    Clinic: [phone]       [facility]: Monday - Thursday 8 am - 7 pm; Friday 8 am - 6 pm  [facility]: [phone]     Patient Education   L?i Khuyên Angelica Sóc D? Phòng  Ðây là l?i khuyên booth chúng tôi thu?ng dana ra d? giúp m?i tequila?i kh?e m?nh. Nhóm angelica sóc c?a quý v? có th? có l?i khuyên c? th? dành rielio cho quý v?. Vui lòng trao d?i v?i nhóm angelica sóc v? marquita c?u angelica sóc d? phòng c?a chính quý v?.  L?i s?ng  T?p th? d?c ít nh?t 150 phút m?i tu?n (30 phút m?i ngày, 5 ngày m?t tu?n).  Th?c hi?n các ho?t d?ng ambrosio cu?ng co 2 ngày m?t tu?n. Ði?u này s? giúp quý v? ki?m soát cân n?ng và connie ng?a b?nh t?t.  Không hút thu?c.  Thoa emiliano ch?ng n?ng d? connie ng?a sushma thu  "da.  Ki?m tra m?c d? radon soumya nhà t? 2 d?n 5 nam m?t l?n. Radon là m?t lo?i khí không màu, không mùi có th? gây h?i cho ph?i c?a quý v?. Ð? tìm hi?u thêm, hãy truy c?p [email or web address]. và tìm ki?m \"Radon in Homes\" (Radon soumya nhà).  Gi? súng không n?p d?n và khóa l?i d? ? alessia an toàn marquita két s?t ho?c h?m ch?a súng, ho?c s? d?ng khóa súng và c?t chìa khóa di. Luôn khóa và c?t d?n ? ch? riêng. Ð? tìm hi?u thêm, hãy truy c?p [email or web address] và tìm ki?m \"safe gun storage\" (c?t gi? súng an toàn).  Lev du?ng  An ít nh?t 5 kh?u ph?n trái cây và delmis qu? m?i ngày.  Hãy th? bánh mì lúa mì, g?o l?t và mì ?ng nguyên h?t (thay vì bánh mì tr?ng, g?o và mì ?ng).  N?p d? canxi và vitamin D. Ki?m tra nhãn trên th?c ph?m và hu?ng t?i 100% dajuan RDA (m?c tiêu th? hàng ngày du?c khuy?n ngh?).  Khám d?nh k?  Khám và v? sinh rang mi?ng 6 tháng m?t l?n.  G?p nhóm angelica sóc s?c kh?e c?a quý v? hàng namd? trao d?i v?:  B?t k? thay d?i nào v? s?c kh?e c?a quý v?.  B?t k? lo?i thu?c nào mà nhóm angelica sóc c?a quý v? dã kê don.  Angelica sóc d? phòng, k? ho?ch hóa hoang dình và cách connie ng?a các b?nh mãn tính.  Tiêm ch?ng (v?c-anna)   Tiêm phòng HPV (d?n 26 tu?i), n?u quý v? petey t?ng tiêm lo?i v?c-anna này karlee?c dó.  Tiêm phòng viêm pauline B (d?n 59 tu?i), n?u quý v? petey t?ng tiêm lo?i v?c-anna này kalree?c dó.  Tiêm phòng COVID-19: Tiêm v?c-anna này khi d?n h?n.  Tiêm phòng cúm: Tiêm phòng cúm hàng nam.  Tiêm phòng u?n ván: Tiêm phòng u?n ván 10 nam m?t l?n.  Tiêm phòng ph? c?u khu?n, viêm pauline A và RSV: Hãy h?i nhóm angelica sóc c?a quý v? xem li?u quý v? có c?n nh?ng jesús tiêm này hay không d?a trên nguy co terry?m b?nh c?a quý v?.  Tiêm phòng Cynthia th?n kinh (dành cho tu?i t? 50 tr? lên).  Xét ruth ann?m s?c kh?e t?ng quát  Sàng l?c b?nh ti?u du?ng:  B?t d?u t? tu?i 35, Khám sàng l?c b?nh ti?u du?ng ít nh?t 3 nam m?t l?n.  N?u quý v? du?i 35 tu?i, hãy h?i nhóm angelica sóc xem quý v? có nên sàng l?c b?nh ti?u du?ng hay không.  Xét ruth ann?m cholesterol: T? tu?i 39, b?t " d?u xét ruth ann?m cholesterol 5 nam m?t l?n, ho?c thu?ng xuyên hon n?u du?c khuy?n ngh?.  Ðo m?t d? xuong (DEXA): ? tu?i 50, hãy h?i nhóm madison sóc c?a quý v? xem quý v? có nên th?c hi?n xét ruth ann?m này d? phát hi?n b?nh loãng xuong (xuong giòn) hay không.  Viêm pauline C: Ði xét ruth ann?m ít nh?t m?t l?n antonio d?i.  Khám sàng l?c phình d?ng m?ch ch? b?ng: Trao d?i v?i bác si c?a quý v? v? vi?c th?c hi?n sàng l?c này n?u quý v?:  Ðã t?ng hút thu?c; và  Là nam gi?i v? m?t sinh h?c; và  Antonio d? tu?i t? 65 d?n 75.  STI (b?nh terry?m trùng celeste du?ng tình d?c)  Christian?c 24 tu?i: Hãy h?i nhóm madison sóc c?a quý v? xem quý v? có nên khám sàng l?c STI hay không.  Jovita 24 tu?i: Sàng l?c STI n?u quý v? có nguy co m?c b?nh. Quý v? có nguy co m?c các b?nh STI (saturnino g?m c? HIV) n?u:  Quý v? có ketty h? tình d?c tich c?c v?i hon m?t tequila?i.  Quý v? không s? d?ng saturnino aponte simpson m?i lúc.  Quý v? ho?c b?n tình du?c ch?n doán m?c b?nh terry?m b?nh lây celeste du?ng tình d?c.  N?u quý v? có nguy co terry?m HIV, hãy h?i rocio thu?c PrEP d? connie ng?a HIV.  Ði xét ruth ann?m HIV ít nh?t m?t l?n antonio d?i, cho dù quý v? có nguy co terry?m HIV hay không.  Xét ruth ann?m sàng l?c sushma thu  Sàng l?c sushma thu c? t? cung: N?u quý v? có c? t? cung, hãy b?t d?u làm xét ruth ann?m sàng l?c sushma thu c? t? cung thu?ng xuyên t? tu?i 21. H?u h?t nh?ng tequila?i khám sàng l?c thu?ng xuyên v?i k?t qu? bình thu?ng d?u có th? ng?ng khám ojvita 65 tu?i. Hãy trao d?i v? v?n d? này v?i bác si c?a quý v?.  Quét sushma thu vú (ch?p olga shelley?n vú): N?u quý v? có hay t?ng có vú, hãy b?t d?u ch?p olga shelley?n vú thu?ng xuyên b?t d?u t? tu?i 40. Ðây là quá trình quét d? ki?m tra sushma thu vú.  Sàng l?c sushma thu d?i tràng: C?n b?t d?u sàng l?c sushma thu d?i tràng t? tu?i 45.  Th?c hi?n xét ruth ann?m n?i soi d?i tràng 10 nam m?t l?n (ho?c thu?ng xuyên hon n?u quý v? có nguy co m?c b?nh) Ho?c, hãy h?i nhà cung c?p c?a quý v? v? các xét ruth ann?m phân marquita xét ruth ann?m FIT hàng nam ho?c xét ruth ann?m Cologuard 3 nam m?t l?n.  Ð? tìm hi?u thêm v?  các tùy ch?n th? ruth ann?m c?a quý v?, hãy truy c?p: [email or web address].  Ð? du?c h? tr? dana ra joce?t d?nh, hãy truy c?p: [email or web address].  Xét ruth ann?m sàng l?c sushma thu shelley?n ti?n li?t: N?u quý v? có shelley?n ti?n li?t và ? d? tu?i t? 55 d?n 69, hãy h?i bác si xem vi?c xét ruth ann?m sàng l?c sushma thu shelley?n ti?n li?t hàng nam có dem l?i l?i ích gì cho quý v? hay không.  Sàng l?c sushma thu ph?i: N?u quý v? dã t?ng ho?c còn saini hút thu?c và t? 50 d?n 80 tu?i, hãy h?i nhóm madison sóc c?a quý v? xem vi?c sàng l?c sushma thu ph?i thu?ng xuyên có phù h?p v?i quý v? hay không.    Ch? nh?m m?c dích kain kh?o. Không th? thay th? l?i khuyên c?a nhà cung c?p d?ch v? madison sóc s?c kh?e c?a quý v?. B?n joce?n   2023 [facility].   B?o luis alfredo m?i joce?n. Ðu?c dánkrystal giá lâm sàng b?i [facility]. [employer] [id number] - REV 04/24.

## 2024-09-24 NOTE — PROGRESS NOTES
Subjective   DARIAN ALANIZ is a 69 year old, presenting for the following health issues:  Hypertension, Diabetes, and Physical        9/24/2024     7:07 AM   Additional Questions   Roomed by John   Accompanied by Spouse     History of Present Illness       Reason for visit:  Health check up   She is taking medications regularly.       Annual Wellness Visit     Patient has been advised of split billing requirements and indicates understanding: Yes     Health Care Directive  Patient does not have a Health Care Directive or Living Will: Discussed advance care planning with patient; information given to patient to review.  In general, how would you rate your overall physical health? good  Do you have a special diet?  Regular (no restrictions)        9/24/2024   Exercise, Social Connection, Stress   Days per week of moderate/strenous exercise 2 days    2 days   Average minutes spent exercising at this level 20 min    20 min   Frequency of gathering with friends or relatives More than 3    More than 3   Feel stress (tense, anxious, or unable to sleep) Not at all    Not at all       Multiple values from one day are sorted in reverse-chronological order     Do you see a dentist two times every year?  Yes  Have you been more tired than usual lately?  No  If you drink alcohol do you typically have >3 drinks per day or >7 drinks per week? No  Do you have a current opioid prescription? No  Do you use any other controlled substances or medications that are not prescribed by a provider? None  Social History     Tobacco Use    Smoking status: Never    Smokeless tobacco: Never   Substance Use Topics    Alcohol use: No     Alcohol/week: 0.0 standard drinks of alcohol    Drug use: No       Needs assistance for the following daily activities: no assistance needed  Which of the following safety concerns are present in your home?  none identified   Do you (or your family members) have any concerns about your safety while driving?   No  Do you have any of the following hearing concerns?: No hearing concerns  In the past 6 months, have you been bothered by leaking of urine? No        12/20/2023   Social Factors   Worry food won't last until get money to buy more Patient declined   Food not last or not have enough money for food? No   Do you have housing? (Housing is defined as stable permanent housing and does not include staying ouside in a car, in a tent, in an abandoned building, in an overnight shelter, or couch-surfing.) Patient declined   Are you worried about losing your housing? Patient declined   Lack of transportation? Patient declined   Unable to get utilities (heat,electricity)? Patient declined             9/24/2024   Fall Risk   Fallen 2 or more times in the past year? No    No   Trouble with walking or balance? No    No       Multiple values from one day are sorted in reverse-chronological order          Today's PHQ-2 Score:       9/24/2024     6:57 AM   PHQ-2 ( 1999 Pf[employer])   Q1: Little interest or pleasure in doing things 0   Q2: Feeling down, depressed or hopeless 0   PHQ-2 Score 0   Q1: Little interest or pleasure in doing things Not at all   Q2: Feeling down, depressed or hopeless Not at all   PHQ-2 Score 0           4/25/2023   LAST FHS-7 RESULTS   1st degree relative breast or ovarian cancer No   Any relative bilateral breast cancer No   Any male have breast cancer No   Any ONE woman have BOTH breast AND ovarian cancer No   Any woman with breast cancer before 50yrs No   2 or more relatives with breast AND/OR ovarian cancer No   2 or more relatives with breast AND/OR bowel cancer No          History of abnormal Pap smear: No - age 30- 64 PAP with HPV every 5 years recommended        Latest Ref Rng & Units 3/31/2021     3:10 PM 3/31/2021     3:05 PM 8/6/2015    10:40 AM   PAP / HPV   PAP (Historical)   NIL  NIL    HPV 16 DNA NEG^Negative Negative   Negative    HPV 18 DNA NEG^Negative Negative   Negative    Other HR HPV  NEG^Negative Negative   Negative      ASCVD Risk   The 10-year ASCVD risk score (Natalia VAUGHAN, et al., 2019) is: 17.7%    Values used to calculate the score:      Age: 69 years      Sex: Female      Is Non- : No      Diabetic: Yes      Tobacco smoker: No      Systolic Blood Pressure: 134 mmHg      Is BP treated: Yes      HDL Cholesterol: 49 mg/dL      Total Cholesterol: 164 mg/dL      Reviewed and updated as needed this visit by Provider                    History reviewed. No pertinent past medical history.  Past Surgical History:   Procedure Laterality Date    ESOPHAGOSCOPY, GASTROSCOPY, DUODENOSCOPY (EGD), COMBINED N/A 10/23/2015    Procedure: COMBINED ESOPHAGOSCOPY, GASTROSCOPY, DUODENOSCOPY (EGD);  Surgeon: Duane, William Charles, MD;  Location: [facility]    ESOPHAGOSCOPY, GASTROSCOPY, DUODENOSCOPY (EGD), COMBINED N/A 10/23/2015    Procedure: COMBINED ESOPHAGOSCOPY, GASTROSCOPY, DUODENOSCOPY (EGD), BIOPSY SINGLE OR MULTIPLE;  Surgeon: Duane, William Charles, MD;  Location: [facility]     OB History   No obstetric history on file.     Lab work is in process  Labs reviewed in EPIC  BP Readings from Last 3 Encounters:   09/24/24 134/82   03/20/24 135/82   12/20/23 138/79    Wt Readings from Last 3 Encounters:   09/24/24 55.3 kg (122 lb)   03/20/24 54.9 kg (121 lb)   12/20/23 54.4 kg (120 lb)                  Patient Active Problem List   Diagnosis    Essential hypertension with goal blood pressure less than 140/90    CARDIOVASCULAR SCREENING; LDL GOAL LESS THAN 130    Chronic fatigue    Gastroesophageal reflux disease with esophagitis    Immigrant with language difficulty    Helicobacter pylori infection    Gallstones    Hyperlipidemia LDL goal <130    Type 2 diabetes mellitus without complication, without long-term current use of insulin (H)     Past Surgical History:   Procedure Laterality Date    ESOPHAGOSCOPY, GASTROSCOPY, DUODENOSCOPY (EGD), COMBINED N/A 10/23/2015    Procedure: COMBINED  ESOPHAGOSCOPY, GASTROSCOPY, DUODENOSCOPY (EGD);  Surgeon: Duane, William Charles, MD;  Location: [facility]    ESOPHAGOSCOPY, GASTROSCOPY, DUODENOSCOPY (EGD), COMBINED N/A 10/23/2015    Procedure: COMBINED ESOPHAGOSCOPY, GASTROSCOPY, DUODENOSCOPY (EGD), BIOPSY SINGLE OR MULTIPLE;  Surgeon: Duane, William Charles, MD;  Location: [facility]       Social History     Tobacco Use    Smoking status: Never    Smokeless tobacco: Never   Substance Use Topics    Alcohol use: No     Alcohol/week: 0.0 standard drinks of alcohol     No family history on file.      Current Outpatient Medications   Medication Sig Dispense Refill    amLODIPine (NORVASC) 2.5 MG tablet Take 1 tablet (2.5 mg) by mouth daily. For blood pressure. 90 tablet 3    Blood Pressure Monitor KIT 1 each as needed (blood pressure check) 1 kit 0    fish oil-omega-3 fatty acids 1000 MG capsule Take 2 g by mouth daily      Multiple Vitamins-Minerals (PRESERVISION AREDS 2 PO)       rosuvastatin (CRESTOR) 5 MG tablet Take 1 tablet (5 mg) by mouth daily. For cholesterol. 90 tablet 3    vitamin B complex with vitamin C (STRESS TAB) tablet Take 1 tablet by mouth daily       Allergies   Allergen Reactions    Contrast Dye Shortness Of Breath and Cough     After CT contrast injection she felt nauseous and lead to cough and shortness of breath. Radiologist felt this was an allergic reaction. Isovue 370     Recent Labs   Lab Test 09/24/24  0819 03/20/24  0903 12/20/23  1023 07/20/23  1352 02/08/22  1501 04/03/21  0940 06/14/19  1555   A1C 7.0* 6.9* 6.4* 7.0*   < >  --   --    LDL  --  65 176* 158*   < > 172* 138*   HDL  --  49* 42* 47*   < > 47* 52   TRIG  --  249* 252* 264*   < > 355* 125   ALT  --   --   --   --   --  40 59*   CR  --  0.63  --  0.72   < > 0.64 0.63   GFRESTIMATED  --  >90  --  >90   < > >90 >90   GFRESTBLACK  --   --   --   --   --  >90 >90   POTASSIUM  --  4.4  --  3.9   < > 3.9 3.6   TSH  --   --   --   --   --   --  1.45    < > = values in this interval not  displayed.        Current providers sharing in care for this patient include:  Patient Care Team:  Samuel Torrez MD as PCP - General (Family Medicine)  Samuel Torrez MD as Assigned PCP    The following health maintenance items are reviewed in Epic and correct as of today:  Health Maintenance   Topic Date Due    DEXA  Never done    RSV VACCINE (1 - Risk 60-74 years 1-dose series) Never done    A1C  06/20/2024    MICROALBUMIN  07/20/2024    DIABETIC FOOT EXAM  07/20/2024    ANNUAL REVIEW OF HM ORDERS  07/20/2024    INFLUENZA VACCINE (1) 09/01/2024    COVID-19 Vaccine (6 - 2024-25 season) 09/01/2024    EYE EXAM  11/15/2024    BMP  03/20/2025    LIPID  03/20/2025    MAMMO SCREENING  04/25/2025    MEDICARE ANNUAL WELLNESS VISIT  09/24/2025    FALL RISK ASSESSMENT  09/24/2025    COLORECTAL CANCER SCREENING  05/03/2026    DTAP/TDAP/TD IMMUNIZATION (4 - Td or Tdap) 08/06/2026    ADVANCE CARE PLANNING  07/20/2028    HEPATITIS C SCREENING  Completed    PHQ-2 (once per calendar year)  Completed    Pneumococcal Vaccine: 65+ Years  Completed    ZOSTER IMMUNIZATION  Completed    HPV IMMUNIZATION  Aged Out    MENINGITIS IMMUNIZATION  Aged Out    RSV MONOCLONAL ANTIBODY  Aged Out    PAP  Discontinued       Appropriate preventive services were discussed with this patient, including applicable screening as appropriate for fall prevention, nutrition, physical activity, Tobacco-use cessation, weight loss and cognition.  Checklist reviewing preventive services available has been given to the patient.           9/24/2024   Mini Cog   Clock Draw Score 2 Normal   3 Item Recall 2 objects recalled   Mini Cog Total Score 4            Review of Systems  Constitutional, HEENT, cardiovascular, pulmonary, GI, , musculoskeletal, neuro, skin, endocrine and psych systems are negative, except as otherwise noted.      Objective    /82 (BP Location: Left arm, Patient Position: Chair, Cuff Size: Adult Regular)   Pulse 74   Temp 97.4  F (36.3  C)  "(Temporal)   Resp 18   Ht 1.473 m (4' 10\")   Wt 55.3 kg (122 lb)   LMP 05/29/2008 (Approximate)   SpO2 98%   BMI 25.50 kg/m    Body mass index is 25.5 kg/m .  Physical Exam   GENERAL: alert and no distress  NECK: no adenopathy, no asymmetry, masses, or scars  RESP: lungs clear to auscultation - no rales, rhonchi or wheezes  CV: regular rate and rhythm, normal S1 S2, no S3 or S4, no murmur, click or rub, no peripheral edema  ABDOMEN: soft, nontender, no hepatosplenomegaly, no masses and bowel sounds normal  MS: no gross musculoskeletal defects noted, no edema    A/P:  (Z00.00) Encounter for Medicare annual wellness exam  (primary encounter diagnosis)  Comment:   Plan: as below.    (E11.9) Type 2 diabetes mellitus without complication, without long-term current use of insulin (H)  Comment:   Plan: HEMOGLOBIN A1C, Comprehensive metabolic panel         (BMP + Alb, Alk Phos, ALT, AST, Total. Bili,         TP), Albumin Random Urine Quantitative with         Creat Ratio        Recheck and adjust if needed. Currently diet controlled. Patient unable to tolerate metformin. Recheck in 6 months.    (I10) Essential hypertension with goal blood pressure less than 140/90  Comment:   Plan: Comprehensive metabolic panel (BMP + Alb, Alk         Phos, ALT, AST, Total. Bili, TP), Albumin         Random Urine Quantitative with Creat Ratio,         amLODIPine (NORVASC) 2.5 MG tablet        Controlled.    (E78.5) Hyperlipidemia LDL goal <100  Comment:   Plan: Comprehensive metabolic panel (BMP + Alb, Alk         Phos, ALT, AST, Total. Bili, TP), Lipid panel         reflex to direct LDL Fasting, rosuvastatin         (CRESTOR) 5 MG tablet        Recheck.    (Z78.0) Asymptomatic postmenopausal status  Comment:   Plan: DEXA HIP/PELVIS/SPINE - Future            (Z23) Encounter for immunization  Comment:   Plan: INFLUENZA HIGH DOSE, TRIVALENT, PF (FLUZONE),         COVID-19 12+ (PFIZER)                    Signed Electronically by: Samuel COOMBS" MD Shan, MD

## 2025-01-05 ENCOUNTER — HEALTH MAINTENANCE LETTER (OUTPATIENT)
Age: 70
End: 2025-01-05

## 2025-02-24 ENCOUNTER — PATIENT OUTREACH (OUTPATIENT)
Dept: CARE COORDINATION | Facility: CLINIC | Age: 70
End: 2025-02-24
Payer: COMMERCIAL

## 2025-03-25 ENCOUNTER — OFFICE VISIT (OUTPATIENT)
Dept: FAMILY MEDICINE | Facility: CLINIC | Age: 70
End: 2025-03-25
Payer: COMMERCIAL

## 2025-03-25 VITALS
RESPIRATION RATE: 18 BRPM | BODY MASS INDEX: 26.41 KG/M2 | HEART RATE: 82 BPM | HEIGHT: 57 IN | DIASTOLIC BLOOD PRESSURE: 73 MMHG | WEIGHT: 122.4 LBS | TEMPERATURE: 97.4 F | OXYGEN SATURATION: 98 % | SYSTOLIC BLOOD PRESSURE: 119 MMHG

## 2025-03-25 DIAGNOSIS — E11.9 TYPE 2 DIABETES MELLITUS WITHOUT COMPLICATION, WITHOUT LONG-TERM CURRENT USE OF INSULIN (H): Primary | ICD-10-CM

## 2025-03-25 DIAGNOSIS — E78.5 HYPERLIPIDEMIA LDL GOAL <100: ICD-10-CM

## 2025-03-25 DIAGNOSIS — I10 ESSENTIAL HYPERTENSION WITH GOAL BLOOD PRESSURE LESS THAN 140/90: ICD-10-CM

## 2025-03-25 DIAGNOSIS — Z12.31 VISIT FOR SCREENING MAMMOGRAM: ICD-10-CM

## 2025-03-25 LAB
ANION GAP SERPL CALCULATED.3IONS-SCNC: 13 MMOL/L (ref 7–15)
BUN SERPL-MCNC: 16.8 MG/DL (ref 8–23)
CALCIUM SERPL-MCNC: 9.2 MG/DL (ref 8.8–10.4)
CHLORIDE SERPL-SCNC: 104 MMOL/L (ref 98–107)
CREAT SERPL-MCNC: 0.62 MG/DL (ref 0.51–0.95)
EGFRCR SERPLBLD CKD-EPI 2021: >90 ML/MIN/1.73M2
EST. AVERAGE GLUCOSE BLD GHB EST-MCNC: 169 MG/DL
GLUCOSE SERPL-MCNC: 282 MG/DL (ref 70–99)
HBA1C MFR BLD: 7.5 % (ref 0–5.6)
HCO3 SERPL-SCNC: 21 MMOL/L (ref 22–29)
POTASSIUM SERPL-SCNC: 4 MMOL/L (ref 3.4–5.3)
SODIUM SERPL-SCNC: 138 MMOL/L (ref 135–145)

## 2025-03-25 PROCEDURE — 1126F AMNT PAIN NOTED NONE PRSNT: CPT | Performed by: FAMILY MEDICINE

## 2025-03-25 PROCEDURE — 80048 BASIC METABOLIC PNL TOTAL CA: CPT | Performed by: FAMILY MEDICINE

## 2025-03-25 PROCEDURE — 36415 COLL VENOUS BLD VENIPUNCTURE: CPT | Performed by: FAMILY MEDICINE

## 2025-03-25 PROCEDURE — 83036 HEMOGLOBIN GLYCOSYLATED A1C: CPT | Performed by: FAMILY MEDICINE

## 2025-03-25 PROCEDURE — 3078F DIAST BP <80 MM HG: CPT | Performed by: FAMILY MEDICINE

## 2025-03-25 PROCEDURE — 99214 OFFICE O/P EST MOD 30 MIN: CPT | Performed by: FAMILY MEDICINE

## 2025-03-25 PROCEDURE — 3074F SYST BP LT 130 MM HG: CPT | Performed by: FAMILY MEDICINE

## 2025-03-25 RX ORDER — AMLODIPINE BESYLATE 2.5 MG/1
2.5 TABLET ORAL DAILY
Qty: 90 TABLET | Refills: 3 | Status: SHIPPED | OUTPATIENT
Start: 2025-03-25

## 2025-03-25 RX ORDER — ROSUVASTATIN CALCIUM 5 MG/1
5 TABLET, COATED ORAL DAILY
Qty: 90 TABLET | Refills: 3 | Status: SHIPPED | OUTPATIENT
Start: 2025-03-25

## 2025-03-25 ASSESSMENT — PAIN SCALES - GENERAL: PAINLEVEL_OUTOF10: NO PAIN (0)

## 2025-03-25 NOTE — PROGRESS NOTES
"  Assessment & Plan     (E11.9) Type 2 diabetes mellitus without complication, without long-term current use of insulin (H)  (primary encounter diagnosis)  Comment:   Plan: Adult Eye  Referral, HEMOGLOBIN A1C,         Basic metabolic panel  (Ca, Cl, CO2, Creat,         Gluc, K, Na, BUN)        Recheck. Diet-controlled. Metformin caused dizziness per patient. May try again in the future. Recheck in 6 months.    (I10) Essential hypertension with goal blood pressure less than 140/90  Comment:   Plan: Basic metabolic panel  (Ca, Cl, CO2, Creat,         Gluc, K, Na, BUN), amLODIPine (NORVASC) 2.5 MG         tablet        Controlled. Recheck renal function and lytes.    (E78.5) Hyperlipidemia LDL goal <100  Comment:   Plan: rosuvastatin (CRESTOR) 5 MG tablet        Controlled.    (Z12.31) Visit for screening mammogram  Comment:   Plan: MA Screening Bilateral w/ Justin              BMI  Estimated body mass index is 26.96 kg/m  as calculated from the following:    Height as of this encounter: 1.435 m (4' 8.5\").    Weight as of this encounter: 55.5 kg (122 lb 6.4 oz).         See Patient Instructions    Subjective   DARIAN ALANIZ is a 69 year old, presenting for the following health issues:  Hypertension and Lipids      3/25/2025     9:01 AM   Additional Questions   Roomed by Yuly   Accompanied by self         3/25/2025     9:01 AM   Patient Reported Additional Medications   Patient reports taking the following new medications none     History of Present Illness       Hyperlipidemia:  She presents for follow up of hyperlipidemia.   She is taking medication to lower cholesterol. She is not having myalgia or other side effects to statin medications.    Hypertension: She presents for follow up of hypertension.  She does check blood pressure  regularly outside of the clinic. Outside blood pressures have been over 140/90. She does not follow a low salt diet.     Reason for visit:  Bp and cholestrol concerns    She eats 4 or " "more servings of fruits and vegetables daily.She consumes 0 sweetened beverage(s) daily.She exercises with enough effort to increase her heart rate 20 to 29 minutes per day.  She exercises with enough effort to increase her heart rate 4 days per week.   She is taking medications regularly.            Review of Systems  Constitutional, HEENT, cardiovascular, pulmonary, GI, , musculoskeletal, neuro, skin, endocrine and psych systems are negative, except as otherwise noted.      Objective    /73 (BP Location: Left arm, Patient Position: Sitting, Cuff Size: Adult Regular)   Pulse 82   Temp 97.4  F (36.3  C) (Temporal)   Resp 18   Ht 1.435 m (4' 8.5\")   Wt 55.5 kg (122 lb 6.4 oz)   LMP 05/29/2008 (Approximate)   SpO2 98%   BMI 26.96 kg/m    Body mass index is 26.96 kg/m .  Physical Exam   GENERAL: alert and no distress  NECK: no adenopathy, no asymmetry, masses, or scars  RESP: lungs clear to auscultation - no rales, rhonchi or wheezes  CV: regular rate and rhythm, normal S1 S2, no S3 or S4, no murmur, click or rub, no peripheral edema  ABDOMEN: soft, nontender, no hepatosplenomegaly, no masses and bowel sounds normal  MS: no gross musculoskeletal defects noted, no edema  Diabetic foot exam: normal DP and PT pulses, no trophic changes or ulcerative lesions, and normal sensory exam          Signed Electronically by: Samuel Torrez MD, MD    "

## 2025-03-25 NOTE — PATIENT INSTRUCTIONS
At [facility], we strive to deliver an exceptional experience to you, every time we see you. If you receive a survey, please let us know what we are doing well and/or what we could improve upon, as we do value your feedback.  If you have MyChart, you can expect to receive results automatically within 24 hours of their completion.  Your provider will send a note interpreting your results as well.   If you do not have MyChart, you should receive your results in about a week by mail.    Your care team:                            Family Medicine Internal Medicine   MD Tonio Leyva, MD Joanne Ring, MD Nicolás Curry, MD Elham Malloy, PADelphineC    Samuel Torrez, MD Pediatrics   Kamille Narayanan, MD Leann Bartlett, MD Grisel Sanchez, APRN CNP Abigail Sandhu APRN CNP   MD Radha Phillip, MD Elvia Peña, CNP     Balta Kinney, CNP Same-Day Provider (No follow-up visits)   YEVGENIY Vargas, DNP Suzan Roach, YEVGENIY Barbour, FNP, BC BELIA HaprerC     Clinic hours: Monday - Thursday 7 am-6 pm; Fridays 7 am-5 pm.   Urgent care: Monday - Friday 10 am- 8 pm; Saturday and Sunday 9 am-5 pm.    Clinic: [phone]       [facility]: Monday - Thursday 8 am - 7 pm; Friday 8 am - 6 pm  [facility]: [phone]

## 2025-03-26 ENCOUNTER — PATIENT OUTREACH (OUTPATIENT)
Dept: CARE COORDINATION | Facility: CLINIC | Age: 70
End: 2025-03-26
Payer: COMMERCIAL

## 2025-05-16 ENCOUNTER — ANCILLARY PROCEDURE (OUTPATIENT)
Dept: MAMMOGRAPHY | Facility: CLINIC | Age: 70
End: 2025-05-16
Payer: COMMERCIAL

## 2025-05-16 DIAGNOSIS — Z12.31 VISIT FOR SCREENING MAMMOGRAM: ICD-10-CM

## 2025-05-16 PROCEDURE — 77067 SCR MAMMO BI INCL CAD: CPT | Mod: TC | Performed by: RADIOLOGY

## 2025-05-16 PROCEDURE — 77063 BREAST TOMOSYNTHESIS BI: CPT | Mod: TC | Performed by: RADIOLOGY

## 2025-07-13 ENCOUNTER — HEALTH MAINTENANCE LETTER (OUTPATIENT)
Age: 70
End: 2025-07-13